# Patient Record
Sex: MALE | Race: AMERICAN INDIAN OR ALASKA NATIVE | NOT HISPANIC OR LATINO | Employment: FULL TIME | URBAN - METROPOLITAN AREA
[De-identification: names, ages, dates, MRNs, and addresses within clinical notes are randomized per-mention and may not be internally consistent; named-entity substitution may affect disease eponyms.]

---

## 2019-10-08 ENCOUNTER — OFFICE VISIT (OUTPATIENT)
Dept: URGENT CARE | Facility: URGENT CARE | Age: 39
End: 2019-10-08
Payer: COMMERCIAL

## 2019-10-08 VITALS
BODY MASS INDEX: 39.11 KG/M2 | DIASTOLIC BLOOD PRESSURE: 80 MMHG | HEIGHT: 70 IN | RESPIRATION RATE: 18 BRPM | HEART RATE: 78 BPM | OXYGEN SATURATION: 97 % | WEIGHT: 273.2 LBS | TEMPERATURE: 98 F | SYSTOLIC BLOOD PRESSURE: 146 MMHG

## 2019-10-08 DIAGNOSIS — M27.3 INFECTION OF TOOTH SOCKET: ICD-10-CM

## 2019-10-08 DIAGNOSIS — K08.9 DENTAL DISORDER: Primary | ICD-10-CM

## 2019-10-08 DIAGNOSIS — R03.0 ELEVATED BLOOD PRESSURE READING WITHOUT DIAGNOSIS OF HYPERTENSION: ICD-10-CM

## 2019-10-08 PROCEDURE — 99204 OFFICE O/P NEW MOD 45 MIN: CPT | Performed by: PHYSICIAN ASSISTANT

## 2019-10-08 RX ORDER — CLINDAMYCIN HCL 300 MG
300 CAPSULE ORAL 3 TIMES DAILY
Qty: 30 CAPSULE | Refills: 0 | Status: SHIPPED | OUTPATIENT
Start: 2019-10-08 | End: 2019-10-18

## 2019-10-08 ASSESSMENT — MIFFLIN-ST. JEOR: SCORE: 2170.48

## 2019-10-09 NOTE — PATIENT INSTRUCTIONS
Ole to follow up with Primary Care provider regarding elevated blood pressure.  To the emergency room if headache does not improve or worsens over the next 24 hours.  Obtain primary and follow-up within 1 week.

## 2019-10-09 NOTE — PROGRESS NOTES
"S: 38-year-old male presents for right upper molar tooth pain for 2 days.  He states he had a right upper tooth extraction done in July.  The dentist said he was unable to get the entire tooth.  After that he had some more tooth extractions on that size and they mentioned that there was a remaining portion of some tooth.  Last 2 days he has had pain in the area.  He has also had a headache.    Past medical history-years ago was diagnosed with prediabetes but lost weight  and blood sugar returned to normal  Family medical history-many with diabetes  Social-non-smoker    Allergies   Allergen Reactions     Penicillins Hives       No past medical history on file.    IBUPROFEN 200 MG OR TABS, 1 TABLET EVERY 4 TO 6 HOURS AS NEEDED    No current facility-administered medications on file prior to visit.       Social History     Tobacco Use     Smoking status: Current Every Day Smoker     Packs/day: 1.00     Smokeless tobacco: Never Used   Substance Use Topics     Alcohol use: No       ROS:  CONSTITUTIONAL: Negative for fatigue or fever.  EYES: Negative for eye problems.  ENT: As above.  RESP: Negative for cough.  CV: Negative for chest pains.  GI: Negative for vomiting.  MUSCULOSKELETAL:  Negative for significant muscle or joint pains.  NEUROLOGIC: Positive for headaches.  SKIN: Negative for rash.  PSYCH: Normal mentation for age.    OBJECTIVE:  BP (!) 168/103   Pulse 78   Temp 98  F (36.7  C) (Oral)   Resp 18   Ht 1.778 m (5' 10\")   Wt 123.9 kg (273 lb 3.2 oz)   SpO2 97%   BMI 39.20 kg/m    GENERAL APPEARANCE: Healthy, alert and no distress.  Repeat /80  EYES:Conjunctiva/sclera clear.  EARS: No cerumen.   Ear canals w/o erythema.  TM's intact w/o erythema.    NOSE/MOUTH: Right posterior upper mouth where her tooth was removed the gum is swollen and inflamed and tender to palpation.    SINUSES: No maxillary sinus tenderness.  THROAT: No erythema w/o tonsillar enlargement . No exudates.  NECK: Supple, nontender, " no lymphadenopathy.  RESP: Lungs clear to auscultation - no rales, rhonchi or wheezes  CV: Regular rate and rhythm, normal S1 S2, no murmur noted.  NEURO: Awake, alert    SKIN: No rashes  Neg pronator drift.  Smile symmetric.      ASSESSMENT:     ICD-10-CM    1. Dental disorder K08.9 clindamycin (CLEOCIN) 300 MG capsule   2. Infection of tooth socket M27.3    3. Elevated blood pressure reading without diagnosis of hypertension R03.0            PLAN:Ole to follow up with Primary Care provider regarding elevated blood pressure.  See dentist 3 to 5 days.  To ER if headache worsens or does not improve over the next 24 hours. HA can be a sign of stroke although I feel the HA is likely from the tooth problem. Obtain primary and follow-up in 1 week.  I have discussed clinical findings with patient.  Side effects of medications discussed.  Symptomatic care is discussed.  I have discussed the possibility of  worsening symptoms and to RTC or ER if they occur.  All questions are answered and patient is in agreement with plan.   Patient care instructions are given to at the end of visit.   Lots of rest and fluids.    Eli Rhoades PA-C

## 2021-12-31 ENCOUNTER — TRANSFERRED RECORDS (OUTPATIENT)
Dept: OPTOMETRY | Facility: CLINIC | Age: 41
End: 2021-12-31

## 2021-12-31 LAB — RETINOPATHY: NORMAL

## 2023-04-19 ENCOUNTER — OFFICE VISIT (OUTPATIENT)
Dept: FAMILY MEDICINE | Facility: CLINIC | Age: 43
End: 2023-04-19
Payer: COMMERCIAL

## 2023-04-19 ENCOUNTER — TELEPHONE (OUTPATIENT)
Dept: FAMILY MEDICINE | Facility: CLINIC | Age: 43
End: 2023-04-19

## 2023-04-19 VITALS
SYSTOLIC BLOOD PRESSURE: 136 MMHG | TEMPERATURE: 98.1 F | OXYGEN SATURATION: 98 % | HEIGHT: 70 IN | RESPIRATION RATE: 16 BRPM | BODY MASS INDEX: 35.99 KG/M2 | WEIGHT: 251.4 LBS | HEART RATE: 83 BPM | DIASTOLIC BLOOD PRESSURE: 84 MMHG

## 2023-04-19 DIAGNOSIS — Z13.220 SCREENING FOR HYPERLIPIDEMIA: ICD-10-CM

## 2023-04-19 DIAGNOSIS — E11.9 TYPE 2 DIABETES MELLITUS WITHOUT COMPLICATION, UNSPECIFIED WHETHER LONG TERM INSULIN USE (H): ICD-10-CM

## 2023-04-19 DIAGNOSIS — Z13.1 SCREENING FOR DIABETES MELLITUS: ICD-10-CM

## 2023-04-19 DIAGNOSIS — Z11.4 SCREENING FOR HIV (HUMAN IMMUNODEFICIENCY VIRUS): ICD-10-CM

## 2023-04-19 DIAGNOSIS — Z11.59 NEED FOR HEPATITIS C SCREENING TEST: ICD-10-CM

## 2023-04-19 DIAGNOSIS — Z00.00 ROUTINE GENERAL MEDICAL EXAMINATION AT A HEALTH CARE FACILITY: Primary | ICD-10-CM

## 2023-04-19 DIAGNOSIS — F32.A DEPRESSION, UNSPECIFIED DEPRESSION TYPE: ICD-10-CM

## 2023-04-19 DIAGNOSIS — E78.1 HYPERTRIGLYCERIDEMIA: ICD-10-CM

## 2023-04-19 DIAGNOSIS — E55.9 VITAMIN D DEFICIENCY: ICD-10-CM

## 2023-04-19 DIAGNOSIS — R35.0 FREQUENT URINATION: ICD-10-CM

## 2023-04-19 LAB
ALBUMIN UR-MCNC: 30 MG/DL
APPEARANCE UR: CLEAR
BASOPHILS # BLD AUTO: 0.1 10E3/UL (ref 0–0.2)
BASOPHILS NFR BLD AUTO: 1 %
BILIRUB UR QL STRIP: NEGATIVE
COLOR UR AUTO: YELLOW
EOSINOPHIL # BLD AUTO: 0.2 10E3/UL (ref 0–0.7)
EOSINOPHIL NFR BLD AUTO: 3 %
ERYTHROCYTE [DISTWIDTH] IN BLOOD BY AUTOMATED COUNT: 12.5 % (ref 10–15)
GLUCOSE UR STRIP-MCNC: >=1000 MG/DL
HBA1C MFR BLD: 14.4 % (ref 0–5.6)
HCT VFR BLD AUTO: 45.8 % (ref 40–53)
HGB BLD-MCNC: 16.8 G/DL (ref 13.3–17.7)
HGB UR QL STRIP: NEGATIVE
KETONES UR STRIP-MCNC: >=80 MG/DL
LEUKOCYTE ESTERASE UR QL STRIP: NEGATIVE
LYMPHOCYTES # BLD AUTO: 2.7 10E3/UL (ref 0.8–5.3)
LYMPHOCYTES NFR BLD AUTO: 36 %
MCH RBC QN AUTO: 31.3 PG (ref 26.5–33)
MCHC RBC AUTO-ENTMCNC: 36.7 G/DL (ref 31.5–36.5)
MCV RBC AUTO: 85 FL (ref 78–100)
MONOCYTES # BLD AUTO: 0.4 10E3/UL (ref 0–1.3)
MONOCYTES NFR BLD AUTO: 6 %
NEUTROPHILS # BLD AUTO: 4.2 10E3/UL (ref 1.6–8.3)
NEUTROPHILS NFR BLD AUTO: 55 %
NITRATE UR QL: NEGATIVE
PH UR STRIP: 5.5 [PH] (ref 5–7)
PLATELET # BLD AUTO: 254 10E3/UL (ref 150–450)
RBC # BLD AUTO: 5.37 10E6/UL (ref 4.4–5.9)
RBC #/AREA URNS AUTO: NORMAL /HPF
SP GR UR STRIP: 1.01 (ref 1–1.03)
UROBILINOGEN UR STRIP-ACNC: 0.2 E.U./DL
WBC # BLD AUTO: 7.6 10E3/UL (ref 4–11)
WBC #/AREA URNS AUTO: NORMAL /HPF

## 2023-04-19 PROCEDURE — 84075 ASSAY ALKALINE PHOSPHATASE: CPT | Performed by: PHYSICIAN ASSISTANT

## 2023-04-19 PROCEDURE — 87389 HIV-1 AG W/HIV-1&-2 AB AG IA: CPT | Performed by: PHYSICIAN ASSISTANT

## 2023-04-19 PROCEDURE — 84443 ASSAY THYROID STIM HORMONE: CPT | Performed by: PHYSICIAN ASSISTANT

## 2023-04-19 PROCEDURE — 99386 PREV VISIT NEW AGE 40-64: CPT | Performed by: PHYSICIAN ASSISTANT

## 2023-04-19 PROCEDURE — 80061 LIPID PANEL: CPT | Performed by: PHYSICIAN ASSISTANT

## 2023-04-19 PROCEDURE — 82306 VITAMIN D 25 HYDROXY: CPT | Performed by: PHYSICIAN ASSISTANT

## 2023-04-19 PROCEDURE — 81001 URINALYSIS AUTO W/SCOPE: CPT | Performed by: PHYSICIAN ASSISTANT

## 2023-04-19 PROCEDURE — 85025 COMPLETE CBC W/AUTO DIFF WBC: CPT | Performed by: PHYSICIAN ASSISTANT

## 2023-04-19 PROCEDURE — 83721 ASSAY OF BLOOD LIPOPROTEIN: CPT | Mod: 59 | Performed by: PHYSICIAN ASSISTANT

## 2023-04-19 PROCEDURE — 82247 BILIRUBIN TOTAL: CPT | Performed by: PHYSICIAN ASSISTANT

## 2023-04-19 PROCEDURE — 80048 BASIC METABOLIC PNL TOTAL CA: CPT | Performed by: PHYSICIAN ASSISTANT

## 2023-04-19 PROCEDURE — 86803 HEPATITIS C AB TEST: CPT | Performed by: PHYSICIAN ASSISTANT

## 2023-04-19 PROCEDURE — 36415 COLL VENOUS BLD VENIPUNCTURE: CPT | Performed by: PHYSICIAN ASSISTANT

## 2023-04-19 PROCEDURE — 83036 HEMOGLOBIN GLYCOSYLATED A1C: CPT | Performed by: PHYSICIAN ASSISTANT

## 2023-04-19 PROCEDURE — 84155 ASSAY OF PROTEIN SERUM: CPT | Performed by: PHYSICIAN ASSISTANT

## 2023-04-19 PROCEDURE — 82040 ASSAY OF SERUM ALBUMIN: CPT | Performed by: PHYSICIAN ASSISTANT

## 2023-04-19 RX ORDER — LANCETS
EACH MISCELLANEOUS
Qty: 60 EACH | Refills: 6 | Status: SHIPPED | OUTPATIENT
Start: 2023-04-19

## 2023-04-19 RX ORDER — METFORMIN HCL 500 MG
TABLET, EXTENDED RELEASE 24 HR ORAL
Qty: 80 TABLET | Refills: 0 | Status: SHIPPED | OUTPATIENT
Start: 2023-04-19 | End: 2023-05-19

## 2023-04-19 RX ORDER — INSULIN GLARGINE 300 U/ML
22 INJECTION, SOLUTION SUBCUTANEOUS AT BEDTIME
Qty: 2.2 ML | Refills: 0 | Status: SHIPPED | OUTPATIENT
Start: 2023-04-19 | End: 2023-05-19

## 2023-04-19 RX ORDER — GLUCOSAMINE HCL/CHONDROITIN SU 500-400 MG
CAPSULE ORAL
Qty: 100 EACH | Refills: 3 | Status: SHIPPED | OUTPATIENT
Start: 2023-04-19

## 2023-04-19 ASSESSMENT — ENCOUNTER SYMPTOMS
NAUSEA: 0
MYALGIAS: 1
HEMATURIA: 0
DIARRHEA: 0
FREQUENCY: 1
CHILLS: 1
NERVOUS/ANXIOUS: 1
ARTHRALGIAS: 1
DYSURIA: 0
ABDOMINAL PAIN: 0
PARESTHESIAS: 1
JOINT SWELLING: 1
DIZZINESS: 1
FEVER: 0
COUGH: 1
CONSTIPATION: 0
SORE THROAT: 0
WEAKNESS: 1
HEADACHES: 0
EYE PAIN: 0
HEARTBURN: 0
SHORTNESS OF BREATH: 0
PALPITATIONS: 1
HEMATOCHEZIA: 0

## 2023-04-19 ASSESSMENT — PAIN SCALES - GENERAL: PAINLEVEL: MODERATE PAIN (5)

## 2023-04-19 ASSESSMENT — PATIENT HEALTH QUESTIONNAIRE - PHQ9
SUM OF ALL RESPONSES TO PHQ QUESTIONS 1-9: 20
10. IF YOU CHECKED OFF ANY PROBLEMS, HOW DIFFICULT HAVE THESE PROBLEMS MADE IT FOR YOU TO DO YOUR WORK, TAKE CARE OF THINGS AT HOME, OR GET ALONG WITH OTHER PEOPLE: SOMEWHAT DIFFICULT
SUM OF ALL RESPONSES TO PHQ QUESTIONS 1-9: 20

## 2023-04-19 NOTE — TELEPHONE ENCOUNTER
Pt notified of provider message as written.  Pt verbalized good understanding.  Sara Vu BSN, RN

## 2023-04-19 NOTE — TELEPHONE ENCOUNTER
Please call patient.     Medications are sent to the [facility]. There is a voucher for one month of insulin waiting for him.     Thierno Farmer PA-C

## 2023-04-19 NOTE — TELEPHONE ENCOUNTER
Writer called and spoke with patient regarding lab results notice from today's visit and plan for diabetes treatment.    Patient stated that during conversation with provider at time of visit, possibility of starting on medication management was discussed, patient thought provider mentioned something about a voucher for one of his prescriptions? Possibly for the insulin?     Patient prescriptions were sent to [facility] pharmacy.     And patient stated he was told to return back to the clinic this evening and provider would show or have someone show him about using insulin pen? Patient asking if he is still supposed to return to the clinic tonight?    Writer unable to find further documentation of these items, unclear of recommendations for voucher or need for insulin/pen teaching.      Routing to provider to review and advise or update team on plan for patient regarding above. Thank you!        Chanell Castillo RN  Clinical Triage/Primary Care  [facility]

## 2023-04-19 NOTE — PROGRESS NOTES
SUBJECTIVE:   CC: Ole is an 42 year old who presents for preventative health visit.        View : No data to display.              Patient has been advised of split billing requirements and indicates understanding: Yes  Healthy Habits:     Getting at least 3 servings of Calcium per day:  NO    Bi-annual eye exam:  Yes    Dental care twice a year:  NO    Sleep apnea or symptoms of sleep apnea:  Daytime drowsiness    Diet:  Regular (no restrictions)    Frequency of exercise:  1 day/week    Duration of exercise:  Less than 15 minutes    Taking medications regularly:  Yes    Medication side effects:  None    PHQ-2 Total Score: 6    Additional concerns today:  Yes    Patient has been donating blood through [facility].  He had to stop donating due to elevated lipids.  He has been trying lifestyle modifications including increased omega-3's.    Patient has history of elevated liver enzymes in the past as he does endorse a history of alcohol abuse.  No longer consuming alcohol.    No family history of colon cancer or prostate cancer.    Patient is currently not a smoker.    Does have issues with daytime drowsiness.  He has poor sleep overnight.  Patient also reports increasing depression over the winter months.  This does tend to exacerbate in the wintertime as he works as a seasonal job outdoors.  During the summertime he has no significant depressive episodes, however, in the winter months these become more apparent.  Further, does feel more stress related to caring for his 6 children.  Patient denies any thoughts of self-harm.  He has seen a therapist in the past for his depression.  He prefers not to be on any medications for his depression.    Today's PHQ-2 Score:       4/19/2023    11:18 AM   PHQ-2 ( 1999 [employer])   Q1: Little interest or pleasure in doing things 3   Q2: Feeling down, depressed or hopeless 3   PHQ-2 Score 6   Q1: Little interest or pleasure in doing things Nearly every day   Q2: Feeling down,  depressed or hopeless Nearly every day   PHQ-2 Score 6       Have you ever done Advance Care Planning? (For example, a Health Directive, POLST, or a discussion with a medical provider or your loved ones about your wishes): No, advance care planning information given to patient to review.  Advanced care planning was discussed at today's visit.    Social History     Tobacco Use     Smoking status: Former     Packs/day: 1.00     Types: Cigarettes     Quit date: 2022     Years since quittin.7     Smokeless tobacco: Never   Vaping Use     Vaping status: Some Days   Substance Use Topics     Alcohol use: No           2023    11:17 AM   Alcohol Use   Prescreen: >3 drinks/day or >7 drinks/week? Not Applicable     Last PSA: No results found for: PSA    Reviewed orders with patient. Reviewed health maintenance and updated orders accordingly - Yes    Reviewed and updated as needed this visit by clinical staff   Tobacco  Allergies  Meds              Reviewed and updated as needed this visit by Provider                 No past medical history on file.   No past surgical history on file.    Review of Systems   Constitutional: Positive for chills. Negative for fever.   HENT: Negative for congestion, ear pain, hearing loss and sore throat.    Eyes: Positive for visual disturbance. Negative for pain.   Respiratory: Positive for cough. Negative for shortness of breath.    Cardiovascular: Positive for chest pain, palpitations and peripheral edema.   Gastrointestinal: Negative for abdominal pain, constipation, diarrhea, heartburn, hematochezia and nausea.   Genitourinary: Positive for frequency and urgency. Negative for dysuria, genital sores, hematuria, impotence and penile discharge.   Musculoskeletal: Positive for arthralgias, joint swelling and myalgias.   Skin: Negative for rash.   Neurological: Positive for dizziness, weakness and paresthesias. Negative for headaches.   Psychiatric/Behavioral: Positive for mood  "changes. The patient is nervous/anxious.      OBJECTIVE:   /84   Pulse 83   Temp 98.1  F (36.7  C) (Tympanic)   Resp 16   Ht 1.778 m (5' 10\")   Wt 114 kg (251 lb 6.4 oz)   SpO2 98%   BMI 36.07 kg/m      Physical Exam   GENERAL: alert, no distress and over weight  EYES: Eyes grossly normal to inspection, PERRL and conjunctivae and sclerae normal  HENT: ear canals and TM's normal, nose and mouth without ulcers or lesions  NECK: no adenopathy, no asymmetry, masses, or scars and thyroid normal to palpation  RESP: lungs clear to auscultation - no rales, rhonchi or wheezes  CV: regular rate and rhythm, normal S1 S2, no S3 or S4, no murmur, click or rub, no peripheral edema and peripheral pulses strong  ABDOMEN: soft, nontender, no hepatosplenomegaly, no masses and bowel sounds normal  MS: no gross musculoskeletal defects noted, no edema  SKIN: no suspicious lesions or rashes  NEURO: Normal strength and tone, mentation intact and speech normal  PSYCH: mentation appears normal, affect normal/bright      ASSESSMENT/PLAN:   (Z00.00) Routine general medical examination at a health care facility  (primary encounter diagnosis)  Comment: Here for routine screening examination.  Plan: Comprehensive metabolic panel (BMP + Alb, Alk         Phos, ALT, AST, Total. Bili, TP), CBC with         platelets and differential          (Z11.4) Screening for HIV (human immunodeficiency virus)  Comment: Discussed screening labs  Plan: HIV Antigen Antibody Combo          (Z11.59) Need for hepatitis C screening test  Comment: Discussed routine labs  Plan: Hepatitis C Screen Reflex to HCV RNA Quant and         Genotype          (Z13.220) Screening for hyperlipidemia  Comment: Discussed screening hyperlipidemia.  Plan: Lipid panel reflex to direct LDL Non-fasting          (Z13.1) Screening for diabetes mellitus  Comment: Discussed screening hemoglobin A1c.  Patient is at frequent urination.  Despite frequently overnight for urination.  " "Family history of diabetes.  Did discuss potential metformin pending these results.  Plan: Hemoglobin A1c          (R35.0) Frequent urination  Comment: Frequent urination.  We will discuss urinalysis may be related to possible diabetes.  Plan: UA Macro with Reflex to Micro and Culture - lab        collect, UA Microscopic with Reflex to Culture          (F32.A) Depression, unspecified depression type  Comment: History of depression.  No current thoughts of self-harm.  Has not been on medications for this previously has not been evaluated therapist.  Has not seen a therapist for multiple years.  Patient was offered potential for medications.  He declined at this time.  Patient is interested in following up with therapist  Plan: TSH with free T4 reflex, Vitamin D Deficiency             COUNSELING:   Reviewed preventive health counseling, as reflected in patient instructions       Regular exercise       Healthy diet/nutrition       Vision screening       Consider Hep C screening for all patients one time for ages 18-79 years       HIV screeninx in teen years, 1x in adult years, and at intervals if high risk      BMI:   Estimated body mass index is 36.07 kg/m  as calculated from the following:    Height as of this encounter: 1.778 m (5' 10\").    Weight as of this encounter: 114 kg (251 lb 6.4 oz).   Weight management plan: Discussed healthy diet and exercise guidelines      He reports that he quit smoking about 8 months ago. His smoking use included cigarettes. He smoked an average of 1 pack per day. He has never used smokeless tobacco.            Thierno Farmer PA-C  [facility]  Answers for HPI/ROS submitted by the patient on 2023  If you checked off any problems, how difficult have these problems made it for you to do your work, take care of things at home, or get along with other people?: Somewhat difficult  PHQ9 TOTAL SCORE: 20      "

## 2023-04-20 ENCOUNTER — NURSE TRIAGE (OUTPATIENT)
Dept: NURSING | Facility: CLINIC | Age: 43
End: 2023-04-20
Payer: COMMERCIAL

## 2023-04-20 ENCOUNTER — TELEPHONE (OUTPATIENT)
Dept: FAMILY MEDICINE | Facility: CLINIC | Age: 43
End: 2023-04-20
Payer: COMMERCIAL

## 2023-04-20 LAB
ALBUMIN SERPL BCG-MCNC: 4.2 G/DL (ref 3.5–5.2)
ALP SERPL-CCNC: 131 U/L (ref 40–129)
ALT SERPL W P-5'-P-CCNC: ABNORMAL U/L
ANION GAP SERPL CALCULATED.3IONS-SCNC: 13 MMOL/L (ref 7–15)
AST SERPL W P-5'-P-CCNC: ABNORMAL U/L
BILIRUB SERPL-MCNC: 0.3 MG/DL
BUN SERPL-MCNC: 12.4 MG/DL (ref 6–20)
CALCIUM SERPL-MCNC: 9.5 MG/DL (ref 8.6–10)
CHLORIDE SERPL-SCNC: 93 MMOL/L (ref 98–107)
CHOLEST SERPL-MCNC: 372 MG/DL
CREAT SERPL-MCNC: 0.62 MG/DL (ref 0.67–1.17)
DEPRECATED CALCIDIOL+CALCIFEROL SERPL-MC: 11 UG/L (ref 20–75)
DEPRECATED HCO3 PLAS-SCNC: 21 MMOL/L (ref 22–29)
GFR SERPL CREATININE-BSD FRML MDRD: >90 ML/MIN/1.73M2
GLUCOSE SERPL-MCNC: 335 MG/DL (ref 70–99)
HCV AB SERPL QL IA: NONREACTIVE
HDLC SERPL-MCNC: 24 MG/DL
HIV 1+2 AB+HIV1 P24 AG SERPL QL IA: NONREACTIVE
LDLC SERPL CALC-MCNC: ABNORMAL MG/DL
LDLC SERPL DIRECT ASSAY-MCNC: 95 MG/DL
NONHDLC SERPL-MCNC: 348 MG/DL
POTASSIUM SERPL-SCNC: 4.3 MMOL/L (ref 3.4–5.3)
PROT SERPL-MCNC: 7.5 G/DL (ref 6.4–8.3)
SODIUM SERPL-SCNC: 127 MMOL/L (ref 136–145)
TRIGL SERPL-MCNC: 2386 MG/DL
TSH SERPL DL<=0.005 MIU/L-ACNC: 1.96 UIU/ML (ref 0.3–4.2)

## 2023-04-20 RX ORDER — VITAMIN B COMPLEX
25 TABLET ORAL DAILY
Qty: 90 TABLET | Refills: 1 | Status: SHIPPED | OUTPATIENT
Start: 2023-04-20 | End: 2023-10-10

## 2023-04-20 NOTE — TELEPHONE ENCOUNTER
Prior Authorization Retail Medication Request    Medication/Dose: Toujeo  ICD code (if different than what is on RX):    Previously Tried and Failed:    Rationale:      Insurance Name:  [facility]  Insurance ID:  [id number]      Pharmacy Information (if different than what is on RX)  Name:  [facility]  Phone:  [phone]      Mary Ann Tejada    Pharmacy Technician  [facility]

## 2023-04-21 ENCOUNTER — TELEPHONE (OUTPATIENT)
Dept: FAMILY MEDICINE | Facility: CLINIC | Age: 43
End: 2023-04-21
Payer: COMMERCIAL

## 2023-04-21 RX ORDER — ATORVASTATIN CALCIUM 40 MG/1
40 TABLET, FILM COATED ORAL DAILY
Qty: 30 TABLET | Refills: 0 | Status: SHIPPED | OUTPATIENT
Start: 2023-04-21 | End: 2023-05-17

## 2023-04-21 RX ORDER — FENOFIBRATE 145 MG/1
145 TABLET, COATED ORAL DAILY
Qty: 30 TABLET | Refills: 0 | Status: SHIPPED | OUTPATIENT
Start: 2023-04-21 | End: 2023-05-17

## 2023-04-21 NOTE — TELEPHONE ENCOUNTER
See also telephone encounter regarding lab results to relay to patient      Guerline PEREZ, RN

## 2023-04-21 NOTE — TELEPHONE ENCOUNTER
Patient notified of provider's message as written below. Patient verbalized good understanding, had no further questions and needed no further support.Sindy Ann R.N.

## 2023-04-21 NOTE — TELEPHONE ENCOUNTER
Pt's spouse, received verbal consent to communicate, pt was prescribed insulin Toujeo yesterday, wanting to confirm dosage of 22 units every night at bedtime. Per chart review, confirmed Rx- 22 units at bedtime for 30 days.    Current BG is 323.     Pt and spouse would like more information about how long acting insulin medication works, thought that 22 units was a bit much. Will route to care team.   Denies symptoms. Advised call back if have other questions/concerns.    Jeannette Reid RN, BSN  4/20/2023 at 9:55 PM  [facility]        Reason for Disposition    Caller has medicine question only, adult not sick, AND triager answers question    Protocols used: MEDICATION QUESTION CALL-A-AH

## 2023-04-21 NOTE — TELEPHONE ENCOUNTER
Patient notified of provider's message as written below. The patient was warm transferred to central scheduling and they were asked to assist patient in making an appointment in 1 month at the patient's preferred location. They verified they understood. Patient verbalized good understanding, had no further questions and needed no further support.Sindy Ann R.N.

## 2023-04-21 NOTE — TELEPHONE ENCOUNTER
Central Prior Authorization Team   Phone: [phone]      DUPLICATE ENCOUNTER - PLEASE SEE ENCOUNTER DATED 04/20/23. MEDICATION WAS SUBMITTED AND DENIED THROUGH [employer].  PLEASE SEE ENCOUNTER FOR APPEAL INFORMATION. I WILL CLOSE THIS ENCOUNTER.

## 2023-04-21 NOTE — TELEPHONE ENCOUNTER
----- Message from Thierno Farmer PA-C sent at 4/21/2023 12:30 PM CDT -----  Please call patient.     Mr. Joseph,     Your cholesterol panel returned with very high triglycerides.  Need to start you on a fibrate medication to help lower this.  Also need to start a statin medication to help improve your overall cholesterol and decrease your cardiovascular risk factors.  Can have muscle aches with statin medication.  If this develops please stop taking this medication.    I have sent these medications to [facility].     Follow-up within 1 month to see how you are tolerating these medications and discuss further medication regimens.    Again if there is any new severe abdominal pain, vomiting or new concerns would need to be seen in the emergency department.    Thierno Farmer PA-C

## 2023-04-21 NOTE — TELEPHONE ENCOUNTER
Call to patient - Left message on answering machine for patient to call back.  Guerline ALVARENGAN, RN

## 2023-04-21 NOTE — TELEPHONE ENCOUNTER
Central Prior Authorization Team   Phone: [phone]      PRIOR AUTHORIZATION DENIED    Medication: insulin glargine U-300 (TOUJEO SOLOSTAR) 300 UNIT/ML (1 units dial) pen - [employer] DENIAL    Denial Date: 4/20/2023    Denial Rational: Coverage of the requested drug is provided when patients have tried two or more preferred chemically unique drugs within the same drug class on the Preferred Drug List: Examples of formulary alternatives include: LANTUS, LEVEMIR. Coverage cannot be authorized at this time.          Appeal Information:

## 2023-04-21 NOTE — TELEPHONE ENCOUNTER
Please call patient.     This dose is appropriate for the patient's weight. This form of insulin is slower onset with no pronounced peak and overall longer duration of activity. This will help get glucose under better control over the next month. Please follow up with diabetic educator.       Thank you,   Thierno Farmer PA-C

## 2023-04-21 NOTE — TELEPHONE ENCOUNTER
Left message on answering machine for patient to call back.    Guerline PEREZ, RN        See also nurse triage message that needs to be relayed to patient     Guerline PEREZ, RN

## 2023-04-27 NOTE — TELEPHONE ENCOUNTER
Insurance will cover Lantus or Levemir. Do you want to change med or appeal??    Thank You  Kelly Mensah, [facility]  [phone]

## 2023-04-28 NOTE — TELEPHONE ENCOUNTER
Patient was provided a 30-day supply of Toujeo via voucher.    Will discuss ongoing medications at next visit as patient has currently scheduled.    Thierno Farmer PA-C

## 2023-05-03 ENCOUNTER — TELEPHONE (OUTPATIENT)
Dept: BEHAVIORAL HEALTH | Facility: CLINIC | Age: 43
End: 2023-05-03
Payer: COMMERCIAL

## 2023-05-03 NOTE — TELEPHONE ENCOUNTER
Writer left a message with the patient, following up from recent appointment with patient's PCP.  Explained writer's role and noted that writer had reviewed their chart and felt she could offer support. Writer encouraged the patient to call A and schedule with the [facility] if they are interested.     CORRIE Nascimento, [facility]  Behavioral Health Clinician  [facility]  [id number] , [place] [address]  [phone]

## 2023-05-08 PROBLEM — E11.9 TYPE 2 DIABETES MELLITUS WITHOUT COMPLICATION (H): Status: ACTIVE | Noted: 2023-05-08

## 2023-05-17 DIAGNOSIS — E78.1 HYPERTRIGLYCERIDEMIA: ICD-10-CM

## 2023-05-17 RX ORDER — FENOFIBRATE 145 MG/1
145 TABLET, COATED ORAL DAILY
Qty: 30 TABLET | Refills: 0 | Status: SHIPPED | OUTPATIENT
Start: 2023-05-17 | End: 2023-05-24

## 2023-05-17 RX ORDER — ATORVASTATIN CALCIUM 40 MG/1
40 TABLET, FILM COATED ORAL DAILY
Qty: 30 TABLET | Refills: 0 | Status: SHIPPED | OUTPATIENT
Start: 2023-05-17 | End: 2023-05-24

## 2023-05-19 ENCOUNTER — ALLIED HEALTH/NURSE VISIT (OUTPATIENT)
Dept: EDUCATION SERVICES | Facility: CLINIC | Age: 43
End: 2023-05-19
Payer: COMMERCIAL

## 2023-05-19 DIAGNOSIS — E11.9 TYPE 2 DIABETES MELLITUS WITHOUT COMPLICATION, UNSPECIFIED WHETHER LONG TERM INSULIN USE (H): ICD-10-CM

## 2023-05-19 PROCEDURE — 99207 PR NO CHARGE NURSE ONLY: CPT

## 2023-05-19 PROCEDURE — G0108 DIAB MANAGE TRN  PER INDIV: HCPCS

## 2023-05-19 RX ORDER — INSULIN GLARGINE 300 U/ML
22 INJECTION, SOLUTION SUBCUTANEOUS AT BEDTIME
Qty: 2.2 ML | Refills: 0 | Status: SHIPPED | OUTPATIENT
Start: 2023-05-19 | End: 2023-12-04

## 2023-05-19 RX ORDER — METFORMIN HCL 500 MG
1000 TABLET, EXTENDED RELEASE 24 HR ORAL 2 TIMES DAILY WITH MEALS
Qty: 360 TABLET | Refills: 1 | Status: SHIPPED | OUTPATIENT
Start: 2023-05-19 | End: 2023-11-09

## 2023-05-19 RX ORDER — BLOOD-GLUCOSE SENSOR
1 EACH MISCELLANEOUS CONTINUOUS
Qty: 6 EACH | Refills: 11 | Status: SHIPPED | OUTPATIENT
Start: 2023-05-19 | End: 2023-06-02

## 2023-05-19 NOTE — LETTER
5/19/2023         RE: Ole Joseph  [address] [place] [address]        Dear Colleague,    Thank you for referring your patient, Ole Joseph, to the [facility]. Please see a copy of my visit note below.    Diabetes Self-Management Education & Support    Presents for: Initial Assessment for new diagnosis    Type of Service: In Person Visit    Assessment Type:   ASSESSMENT:  Ole is here newly diagnosed with diabetes he said he was told years ago that he was prediabetes follow-up and they said it was gone he has children that he is active with past history of alcoholism and he has not had alcohol in years many family members with diabetes and he is so glad he came in to see a doctor without the symptoms he had for diabetes he is well into learning as much as he can about diabetes and management and meal planning he has been cutting back on carbs avoiding them so I went over the plate planning method with him he is actively exercising and the  at his club also told him he is not eating enough carbohydrates for with the exercise and management for his diabetes, I did place a sensor on him today we will look and see how his blood sugars are over the weekend and possibly take him off of insulin and starting a GLP-1 for him when I follow-up with him he is a great candidate for this and I believe he will do very very well he does have children that are heading down the same road and he is hoping to give them a good example of how to treat his diabetes and to live a healthy life    Patient's most recent   Lab Results   Component Value Date    A1C 14.4 04/19/2023     is not meeting goal of <7.0    Diabetes knowledge and skills assessment:   Patient is knowledgeable in diabetes management concepts related to: Being Active, Monitoring, Taking Medication and Healthy Coping    Continue education with the following diabetes management concepts: Healthy Eating, Taking Medication,  Problem Solving and Reducing Risks    Based on learning assessment above, most appropriate setting for further diabetes education would be: Group class setting.      PLAN    1. Metformin  mg take 4 pills daily  2. Toujeo 22 units daily  3. Consider putting you on either Ozempic, Trulicity or Mounjaro.    4. 3 meals per day with 3-5 carb choices, with proteins , and 2-3 snacks each day with 1-2 carb choices   5. Keep up exercises and stay hydrated.    Topics to cover at upcoming visits: Healthy Eating, Monitoring, Taking Medication and Problem Solving    Follow-up:     See Care Plan for co-developed, patient-state behavior change goals.  AVS provided for patient today.    Education Materials Provided:   [employer] Understanding Diabetes Booklet, Carbohydrate Counting and My Plate Planner      SUBJECTIVE/OBJECTIVE:  Presents for: Initial Assessment for new diagnosis  Accompanied by: Self  Diabetes education in the past 24mo: No  Focus of Visit: Diabetes Pathophysiology, Assistance w/ making life changes, Taking Medication, Healthy Eating  Diabetes type: Type 2  Disease course: Improving  How confident are you filling out medical forms by yourself:: Extremely  Transportation concerns: No  Difficulty affording diabetes medication?: Sometimes  Difficulty affording diabetes testing supplies?: No  Other concerns:: None  Cultural Influences/Ethnic Background:  Not  or       Diabetes Symptoms & Complications:  Fatigue: Sometimes  Neuropathy: Sometimes  Polydipsia: No  Polyphagia: Yes  Polyuria: No  Visual change: Yes  Slow healing wounds: Sometimes  Symptom course: Improving  Weight trend: Stable  Autonomic neuropathy: Other  CVA: No  Heart disease: No  Nephropathy: Other  Peripheral neuropathy: Other  Peripheral Vascular Disease: Other  Retinopathy: No  Sexual dysfunction: No    Patient Problem List and Family Medical History reviewed for relevant medical history, current medical status, and  "diabetes risk factors.    Vitals:  There were no vitals taken for this visit.  Estimated body mass index is 36.07 kg/m  as calculated from the following:    Height as of 4/19/23: 1.778 m (5' 10\").    Weight as of 4/19/23: 114 kg (251 lb 6.4 oz).   Last 3 BP:   BP Readings from Last 3 Encounters:   04/19/23 136/84   10/08/19 (!) 146/80   08/19/14 149/49       History   Smoking Status     Former     Packs/day: 1.00     Types: Cigarettes     Quit date: 8/1/2022   Smokeless Tobacco     Never       Labs:  Lab Results   Component Value Date    A1C 14.4 04/19/2023     Lab Results   Component Value Date     04/19/2023     Lab Results   Component Value Date    LDL  04/19/2023      Comment:      Cannot estimate LDL when triglyceride exceeds 400 mg/dL    LDL 95 04/19/2023     Direct Measure HDL   Date Value Ref Range Status   04/19/2023 24 (L) >=40 mg/dL Final   ]  GFR Estimate   Date Value Ref Range Status   04/19/2023 >90 >60 mL/min/1.73m2 Final     Comment:     eGFR calculated using 2021 CKD-EPI equation.     No results found for: GFRESTBLACK  Lab Results   Component Value Date    CR 0.62 04/19/2023     No results found for: MICROALBUMIN    Healthy Eating:  Cultural/Orthodox diet restrictions?: No  Meal planning/habits: Avoiding sweets, Carb counting, Heart healthy, Low salt, Smaller portions, Plate planning method  How many times a week on average do you eat food made away from home (restaurant/take-out)?: 1  Breakfast: skips, Glycerna,  Lunch: skips  Dinner: chx, breast drummie and wing, and salad  Beverages: Water, Milk  Has patient met with a dietitian in the past?: No    Being Active:  Being Active Assessed Today: Yes  Exercise:: Yes  Days per week of moderate to strenuous exercise (like a brisk walk): 6  On average, minutes per day of exercise at this level: 150 or greater  How intense was your typical exercise? : Heavy (like jogging or swimming  Exercise Minutes per Week: 900  Barrier to exercise: Time, " Access    Monitoring:  Monitoring Assessed Today: Yes  Did patient bring glucose meter to appointment? : Yes  Blood Glucose Meter: ContourNext  Times checking blood sugar at home (number): 2  Times checking blood sugar at home (per): Day  Blood glucose trend: Decreasing        Taking Medications:  Diabetes Medication(s)     Biguanides       metFORMIN (GLUCOPHAGE XR) 500 MG 24 hr tablet    Take 2 tablets (1,000 mg) by mouth 2 times daily (with meals) Take 2 pills with breakfast and 2 pills with dinner    Insulin       insulin degludec (TRESIBA) 100 UNIT/ML pen    Take 22 units daily + use 2 unit prime with each dose for a total of 30 units/day     insulin glargine U-300 (TOUJEO SOLOSTAR) 300 UNIT/ML (1 units dial) pen    Inject 22 Units Subcutaneous At Bedtime          Current Treatments: Diet, Insulin Injections, Oral Medication (taken by mouth)  Dose schedule: At bedtime  Given by: Patient  Injection/Infusion sites: Abdomen  Problems taking diabetes medications regularly?: No  Diabetes medication side effects?: No    Problem Solving:  Problem Solving Assessed Today: No              Reducing Risks:  Diabetes Risks: Hypertriglyceridemia, Family History  CAD Risks: Diabetes Mellitus, Dyslipidemia, Family history, Stress, Male sex  Has dilated eye exam at least once a year?: No  Sees dentist every 6 months?: No  Feet checked by healthcare provider in the last year?: No    Healthy Coping:  Healthy Coping Assessed Today: Yes  Emotional response to diabetes: Ready to learn  Informal Support system:: Children, Family, Friends, Parent, Partner  Stage of change: PREPARATION (Decided to change - considering how)  Patient Activation Measure Survey Score:       View : No data to display.                  Care Plan and Education Provided:  Care Plan: Diabetes   Updates made by Aimee Nickerson RN since 5/19/2023 12:00 AM      Problem: HbA1C Not In Goal       Goal: Establish Regular Follow-Ups with PCP    Start Date: 5/19/2023    This Visit's Progress: 50%      Task: Discuss with PCP the recommended timing for patient's next follow up visit(s) Completed 5/19/2023   Responsible User: Aimee Nickerson RN      Task: Discuss schedule for PCP visits with patient Completed 5/19/2023   Responsible User: Aimee Nickerson RN      Goal: Get HbA1C Level in Goal    Start Date: 5/19/2023   This Visit's Progress: 0%      Task: Educate patient on diabetes education self-management topics Completed 5/19/2023   Responsible User: Aimee Nickerson RN      Task: Educate patient on benefits of regular glucose monitoring Completed 5/19/2023   Responsible User: Aimee Nickerson RN      Task: Refer patient to appropriate extended care team member, as needed (Medication Therapy Management, Behavioral Health, Physical Therapy, etc.)    Responsible User: Aimee Nickerson RN      Task: Discuss diabetes treatment plan with patient Completed 5/19/2023   Responsible User: Aimee Nickerson RN      Problem: Diabetes Self-Management Education Needed to Optimize Self-Care Behaviors       Goal: Understand diabetes pathophysiology and disease progression    Start Date: 5/19/2023   This Visit's Progress: 30%      Task: Provide education on diabetes pathophysiology and disease progression specfic to patient's diabetes type Completed 5/19/2023   Responsible User: Aimee Nickerson RN      Goal: Healthy Eating - follow a healthy eating pattern for diabetes    Start Date: 5/19/2023   This Visit's Progress: 50%      Task: Provide education on portion control and consistency in amount, composition and timing of food intake Completed 5/19/2023   Responsible User: Aimee Nickerson RN      Task: Provide education on managing carbohydrate intake (carbohydrate counting, plate planning method, etc.) Completed 5/19/2023   Responsible User: Aimee Nickerson RN      Task: Provide education on weight management    Responsible User: Aimee Nickerson RN      Task: Provide education on heart healthy eating  Completed 5/19/2023   Responsible User: Aimee Nickerson RN      Task: Provide education on eating out Completed 5/19/2023   Responsible User: Aimee Nickerson RN      Task: Develop individualized healthy eating plan with patient Completed 5/19/2023   Responsible User: Aimee Nickerson RN      Goal: Being Active - get regular physical activity, working up to at least 150 minutes per week    Start Date: 5/19/2023   This Visit's Progress: 100%      Task: Provide education on relationship of activity to glucose and precautions to take if at risk for low glucose Completed 5/19/2023   Responsible User: Aimee Nickerson RN      Task: Discuss barriers to physical activity with patient Completed 5/19/2023   Responsible User: Aimee Nickerson RN      Task: Develop physical activity plan with patient Completed 5/19/2023   Responsible User: Aimee Nickerson RN      Task: Explore community resources including walking groups, assistance programs, and home videos    Responsible User: Aimee Nickerson RN      Goal: Monitoring - monitor glucose and ketones as directed    Start Date: 5/19/2023   This Visit's Progress: 80%      Task: Provide education on blood glucose monitoring (purpose, proper technique, frequency, glucose targets, interpreting results, when to use glucose control solution, sharps disposal) Completed 5/19/2023   Responsible User: Amiee Nickerson RN      Task: Provide education on continuous glucose monitoring (sensor placement, use of agustin or /reader, understanding glucose trends, alerts and alarms, differences between sensor glucose and blood glucose) Completed 5/19/2023   Responsible User: Aimee Nickerson RN      Task: Provide education on ketone monitoring (when to monitor, frequency, etc.)    Responsible User: Aimee Nickerson RN      Goal: Taking Medication - patient is consistently taking medications as directed    Start Date: 5/19/2023   This Visit's Progress: 50%      Task: Provide education on action of  prescribed medication, including when to take and possible side effects Completed 5/19/2023   Responsible User: Aimee Nickerson RN      Task: Provide education on insulin and injectable diabetes medications, including administration, storage, site selection and rotation for injection sites Completed 5/19/2023   Responsible User: Aimee Nickerson RN      Task: Discuss barriers to medication adherence with patient and provide management technique ideas as appropriate Completed 5/19/2023   Responsible User: Aimee Nickerson RN      Task: Provide education on frequency and refill details of medications Completed 5/19/2023   Responsible User: Aimee Nickerson RN      Goal: Problem Solving - know how to prevent and manage short-term diabetes complications    Start Date: 5/19/2023   This Visit's Progress: 50%      Task: Provide education on high blood glucose - causes, signs/symptoms, prevention and treatment Completed 5/19/2023   Responsible User: Aimee Nickerson RN      Task: Provide education on low blood glucose - causes, signs/symptoms, prevention, treatment, carrying a carbohydrate source at all times, and medical identification Completed 5/19/2023   Responsible User: Aimee Nickerson RN      Task: Provide education on safe travel with diabetes    Responsible User: Aimee Nickerson RN      Task: Provide education on how to care for diabetes on sick days Completed 5/19/2023   Responsible User: Aimee Nickerson RN      Task: Provide education on when to call a health care provider Completed 5/19/2023   Responsible User: Aimee Nickerson RN      Goal: Reducing Risks - know how to prevent and treat long-term diabetes complications    Start Date: 5/19/2023   This Visit's Progress: 50%      Task: Provide education on major complications of diabetes, prevention, early diagnostic measures and treatment of complications Completed 5/19/2023   Responsible User: Aimee Nickerson RN      Task: Provide education on recommended care for  dental, eye and foot health Completed 5/19/2023   Responsible User: Aimee Nickerson RN      Task: Provide education on Hemoglobin A1c - goals and relationship to blood glucose levels Completed 5/19/2023   Responsible User: Aimee Nickerson RN      Task: Provide education on recommendations for heart health - lipid levels and goals, blood pressure and goals, and aspirin therapy, if indicated Completed 5/19/2023   Responsible User: Aimee Nickerson RN      Task: Provide education on tobacco cessation    Responsible User: Aimee Nickerson RN      Goal: Healthy Coping - use available resources to cope with the challenges of managing diabetes    Start Date: 5/19/2023   This Visit's Progress: 90%      Task: Discuss recognizing feelings about having diabetes Completed 5/19/2023   Responsible User: Aimee Nickerson RN      Task: Provide education on the benefits of making appropriate lifestyle changes Completed 5/19/2023   Responsible User: Aimee Nickerson RN      Task: Provide education on benefits of utilizing support systems Completed 5/19/2023   Responsible User: Aimee Nickerson RN      Task: Discuss methods for coping with stress Completed 5/19/2023   Responsible User: Aimee Nickerson RN      Task: Provide education on when to seek professional counseling    Responsible User: Aimee Nickerson RN Sue Truhler RN/PREMA Bravo Diabetes Educator      Time Spent: 90 minutes  Encounter Type: Individual    Any diabetes medication dose changes were made via the CDE Protocol per the patient's primary care provider. A copy of this encounter was shared with the provider.

## 2023-05-19 NOTE — PATIENT INSTRUCTIONS
Diabetes Support Resources:  Metformin  mg take 4 pills daily  Toujeo 22 units daily  Consider putting you on either Ozempic, Trulicity or Mounjaro.    3 meals per day with 3-5 carb choices, with proteins , and 2-3 snacks each day with 1-2 carb choices   Keep up exercises and stay hydrated.       Bring blood glucose meter and logbook with you to all doctor and follow-up appointments.    Diabetes Education Telephone Visit Follow-up:    We realize your time is valuable and your health is important! We offer a convenient Telephone Visit follow up! It s a quick way to check in for a medication dose adjustment without having to come back to clinic as soon.    Telephone Visits are often covered by insurance. Please check with your insurance plan to see if this type of visit is covered. If not, the cost is less expensive than an office visit:    Up to 10 minutes (Code 24724): $30  11-20 minutes (Code 55805): $59  More than 20 minutes (Code 17978): $85    Talk with your Diabetes Educator if you want to learn more.      [facility]:  For Your Diabetes Education and Nutrition Appointments Call:  [phone]   For Diabetes Education or Nutrition Related Questions:   Phone: [phone]  Send [employer] Message   If you need a medication refill please contact your pharmacy. Please allow 3 business days for your refills to be completed.

## 2023-05-19 NOTE — PROGRESS NOTES
Diabetes Self-Management Education & Support    Presents for: Initial Assessment for new diagnosis    Type of Service: In Person Visit    Assessment Type:   ASSESSMENT:  Ole is here newly diagnosed with diabetes he said he was told years ago that he was prediabetes follow-up and they said it was gone he has children that he is active with past history of alcoholism and he has not had alcohol in years many family members with diabetes and he is so glad he came in to see a doctor without the symptoms he had for diabetes he is well into learning as much as he can about diabetes and management and meal planning he has been cutting back on carbs avoiding them so I went over the plate planning method with him he is actively exercising and the  at his club also told him he is not eating enough carbohydrates for with the exercise and management for his diabetes, I did place a sensor on him today we will look and see how his blood sugars are over the weekend and possibly take him off of insulin and starting a GLP-1 for him when I follow-up with him he is a great candidate for this and I believe he will do very very well he does have children that are heading down the same road and he is hoping to give them a good example of how to treat his diabetes and to live a healthy life    Patient's most recent   Lab Results   Component Value Date    A1C 14.4 04/19/2023     is not meeting goal of <7.0    Diabetes knowledge and skills assessment:   Patient is knowledgeable in diabetes management concepts related to: Being Active, Monitoring, Taking Medication and Healthy Coping    Continue education with the following diabetes management concepts: Healthy Eating, Taking Medication, Problem Solving and Reducing Risks    Based on learning assessment above, most appropriate setting for further diabetes education would be: Group class setting.      PLAN    1. Metformin  mg take 4 pills daily  2. Toujeo 22 units  "daily  3. Consider putting you on either Ozempic, Trulicity or Mounjaro.    4. 3 meals per day with 3-5 carb choices, with proteins , and 2-3 snacks each day with 1-2 carb choices   5. Keep up exercises and stay hydrated.    Topics to cover at upcoming visits: Healthy Eating, Monitoring, Taking Medication and Problem Solving    Follow-up:     See Care Plan for co-developed, patient-state behavior change goals.  AVS provided for patient today.    Education Materials Provided:  [facility] Understanding Diabetes Booklet, Carbohydrate Counting and My Plate Planner      SUBJECTIVE/OBJECTIVE:  Presents for: Initial Assessment for new diagnosis  Accompanied by: Self  Diabetes education in the past 24mo: No  Focus of Visit: Diabetes Pathophysiology, Assistance w/ making life changes, Taking Medication, Healthy Eating  Diabetes type: Type 2  Disease course: Improving  How confident are you filling out medical forms by yourself:: Extremely  Transportation concerns: No  Difficulty affording diabetes medication?: Sometimes  Difficulty affording diabetes testing supplies?: No  Other concerns:: None  Cultural Influences/Ethnic Background:  Not  or       Diabetes Symptoms & Complications:  Fatigue: Sometimes  Neuropathy: Sometimes  Polydipsia: No  Polyphagia: Yes  Polyuria: No  Visual change: Yes  Slow healing wounds: Sometimes  Symptom course: Improving  Weight trend: Stable  Autonomic neuropathy: Other  CVA: No  Heart disease: No  Nephropathy: Other  Peripheral neuropathy: Other  Peripheral Vascular Disease: Other  Retinopathy: No  Sexual dysfunction: No    Patient Problem List and Family Medical History reviewed for relevant medical history, current medical status, and diabetes risk factors.    Vitals:  There were no vitals taken for this visit.  Estimated body mass index is 36.07 kg/m  as calculated from the following:    Height as of 4/19/23: 1.778 m (5' 10\").    Weight as of 4/19/23: 114 kg (251 lb 6.4 " oz).   Last 3 BP:   BP Readings from Last 3 Encounters:   04/19/23 136/84   10/08/19 (!) 146/80   08/19/14 149/49       History   Smoking Status     Former     Packs/day: 1.00     Types: Cigarettes     Quit date: 8/1/2022   Smokeless Tobacco     Never       Labs:  Lab Results   Component Value Date    A1C 14.4 04/19/2023     Lab Results   Component Value Date     04/19/2023     Lab Results   Component Value Date    LDL  04/19/2023      Comment:      Cannot estimate LDL when triglyceride exceeds 400 mg/dL    LDL 95 04/19/2023     Direct Measure HDL   Date Value Ref Range Status   04/19/2023 24 (L) >=40 mg/dL Final   ]  GFR Estimate   Date Value Ref Range Status   04/19/2023 >90 >60 mL/min/1.73m2 Final     Comment:     eGFR calculated using 2021 CKD-EPI equation.     No results found for: GFRESTBLACK  Lab Results   Component Value Date    CR 0.62 04/19/2023     No results found for: MICROALBUMIN    Healthy Eating:  Cultural/Yarsani diet restrictions?: No  Meal planning/habits: Avoiding sweets, Carb counting, Heart healthy, Low salt, Smaller portions, Plate planning method  How many times a week on average do you eat food made away from home (restaurant/take-out)?: 1  Breakfast: skips, Glycerna,  Lunch: skips  Dinner: chx, breast drummie and wing, and salad  Beverages: Water, Milk  Has patient met with a dietitian in the past?: No    Being Active:  Being Active Assessed Today: Yes  Exercise:: Yes  Days per week of moderate to strenuous exercise (like a brisk walk): 6  On average, minutes per day of exercise at this level: 150 or greater  How intense was your typical exercise? : Heavy (like jogging or swimming  Exercise Minutes per Week: 900  Barrier to exercise: Time, Access    Monitoring:  Monitoring Assessed Today: Yes  Did patient bring glucose meter to appointment? : Yes  Blood Glucose Meter: ContourNext  Times checking blood sugar at home (number): 2  Times checking blood sugar at home (per): Day  Blood  glucose trend: Decreasing        Taking Medications:  Diabetes Medication(s)     Biguanides       metFORMIN (GLUCOPHAGE XR) 500 MG 24 hr tablet    Take 2 tablets (1,000 mg) by mouth 2 times daily (with meals) Take 2 pills with breakfast and 2 pills with dinner    Insulin       insulin degludec (TRESIBA) 100 UNIT/ML pen    Take 22 units daily + use 2 unit prime with each dose for a total of 30 units/day     insulin glargine U-300 (TOUJEO SOLOSTAR) 300 UNIT/ML (1 units dial) pen    Inject 22 Units Subcutaneous At Bedtime          Current Treatments: Diet, Insulin Injections, Oral Medication (taken by mouth)  Dose schedule: At bedtime  Given by: Patient  Injection/Infusion sites: Abdomen  Problems taking diabetes medications regularly?: No  Diabetes medication side effects?: No    Problem Solving:  Problem Solving Assessed Today: No              Reducing Risks:  Diabetes Risks: Hypertriglyceridemia, Family History  CAD Risks: Diabetes Mellitus, Dyslipidemia, Family history, Stress, Male sex  Has dilated eye exam at least once a year?: No  Sees dentist every 6 months?: No  Feet checked by healthcare provider in the last year?: No    Healthy Coping:  Healthy Coping Assessed Today: Yes  Emotional response to diabetes: Ready to learn  Informal Support system:: Children, Family, Friends, Parent, Partner  Stage of change: PREPARATION (Decided to change - considering how)  Patient Activation Measure Survey Score:       View : No data to display.                  Care Plan and Education Provided:  Care Plan: Diabetes   Updates made by Aimee Nickerson RN since 5/19/2023 12:00 AM      Problem: HbA1C Not In Goal       Goal: Establish Regular Follow-Ups with PCP    Start Date: 5/19/2023   This Visit's Progress: 50%      Task: Discuss with PCP the recommended timing for patient's next follow up visit(s) Completed 5/19/2023   Responsible User: Aimee Nickerson RN      Task: Discuss schedule for PCP visits with patient Completed  5/19/2023   Responsible User: Aimee Nickerson RN      Goal: Get HbA1C Level in Goal    Start Date: 5/19/2023   This Visit's Progress: 0%      Task: Educate patient on diabetes education self-management topics Completed 5/19/2023   Responsible User: Aimee Nickerson RN      Task: Educate patient on benefits of regular glucose monitoring Completed 5/19/2023   Responsible User: Aimee Nickerson RN      Task: Refer patient to appropriate extended care team member, as needed (Medication Therapy Management, Behavioral Health, Physical Therapy, etc.)    Responsible User: Aimee Nickerson RN      Task: Discuss diabetes treatment plan with patient Completed 5/19/2023   Responsible User: Aimee Nickerson RN      Problem: Diabetes Self-Management Education Needed to Optimize Self-Care Behaviors       Goal: Understand diabetes pathophysiology and disease progression    Start Date: 5/19/2023   This Visit's Progress: 30%      Task: Provide education on diabetes pathophysiology and disease progression specfic to patient's diabetes type Completed 5/19/2023   Responsible User: Aimee Nickerson RN      Goal: Healthy Eating - follow a healthy eating pattern for diabetes    Start Date: 5/19/2023   This Visit's Progress: 50%      Task: Provide education on portion control and consistency in amount, composition and timing of food intake Completed 5/19/2023   Responsible User: Aimee Nickerson RN      Task: Provide education on managing carbohydrate intake (carbohydrate counting, plate planning method, etc.) Completed 5/19/2023   Responsible User: Aimee Nickerson RN      Task: Provide education on weight management    Responsible User: Aimee Nickerson RN      Task: Provide education on heart healthy eating Completed 5/19/2023   Responsible User: Aimee Nickerson RN      Task: Provide education on eating out Completed 5/19/2023   Responsible User: Aimee Nickerson RN      Task: Develop individualized healthy eating plan with patient Completed  5/19/2023   Responsible User: Aimee Nickerson RN      Goal: Being Active - get regular physical activity, working up to at least 150 minutes per week    Start Date: 5/19/2023   This Visit's Progress: 100%      Task: Provide education on relationship of activity to glucose and precautions to take if at risk for low glucose Completed 5/19/2023   Responsible User: Aimee Nickerson RN      Task: Discuss barriers to physical activity with patient Completed 5/19/2023   Responsible User: Aimee Nickerson RN      Task: Develop physical activity plan with patient Completed 5/19/2023   Responsible User: Aimee Nickerson RN      Task: Explore community resources including walking groups, assistance programs, and home videos    Responsible User: Aimee Nickerson RN      Goal: Monitoring - monitor glucose and ketones as directed    Start Date: 5/19/2023   This Visit's Progress: 80%      Task: Provide education on blood glucose monitoring (purpose, proper technique, frequency, glucose targets, interpreting results, when to use glucose control solution, sharps disposal) Completed 5/19/2023   Responsible User: Aimee Nickerson RN      Task: Provide education on continuous glucose monitoring (sensor placement, use of agustin or /reader, understanding glucose trends, alerts and alarms, differences between sensor glucose and blood glucose) Completed 5/19/2023   Responsible User: Aimee Nickerson RN      Task: Provide education on ketone monitoring (when to monitor, frequency, etc.)    Responsible User: Aimee Nickerson RN      Goal: Taking Medication - patient is consistently taking medications as directed    Start Date: 5/19/2023   This Visit's Progress: 50%      Task: Provide education on action of prescribed medication, including when to take and possible side effects Completed 5/19/2023   Responsible User: Aimee Nickerson RN      Task: Provide education on insulin and injectable diabetes medications, including administration,  storage, site selection and rotation for injection sites Completed 5/19/2023   Responsible User: Aimee Nickerson RN      Task: Discuss barriers to medication adherence with patient and provide management technique ideas as appropriate Completed 5/19/2023   Responsible User: Aimee Nickerson RN      Task: Provide education on frequency and refill details of medications Completed 5/19/2023   Responsible User: Aimee Nickerson RN      Goal: Problem Solving - know how to prevent and manage short-term diabetes complications    Start Date: 5/19/2023   This Visit's Progress: 50%      Task: Provide education on high blood glucose - causes, signs/symptoms, prevention and treatment Completed 5/19/2023   Responsible User: Aimee Nickerson RN      Task: Provide education on low blood glucose - causes, signs/symptoms, prevention, treatment, carrying a carbohydrate source at all times, and medical identification Completed 5/19/2023   Responsible User: Aimee Nickerson RN      Task: Provide education on safe travel with diabetes    Responsible User: Aimee Nickerson RN      Task: Provide education on how to care for diabetes on sick days Completed 5/19/2023   Responsible User: Aimee Nickerson RN      Task: Provide education on when to call a health care provider Completed 5/19/2023   Responsible User: Aimee Nickerson RN      Goal: Reducing Risks - know how to prevent and treat long-term diabetes complications    Start Date: 5/19/2023   This Visit's Progress: 50%      Task: Provide education on major complications of diabetes, prevention, early diagnostic measures and treatment of complications Completed 5/19/2023   Responsible User: Aimee Nickerson RN      Task: Provide education on recommended care for dental, eye and foot health Completed 5/19/2023   Responsible User: Aimee Nickerson RN      Task: Provide education on Hemoglobin A1c - goals and relationship to blood glucose levels Completed 5/19/2023   Responsible User: Liya  EVA Yu      Task: Provide education on recommendations for heart health - lipid levels and goals, blood pressure and goals, and aspirin therapy, if indicated Completed 5/19/2023   Responsible User: Aimee Nickerson RN      Task: Provide education on tobacco cessation    Responsible User: Aimee Nickerson RN      Goal: Healthy Coping - use available resources to cope with the challenges of managing diabetes    Start Date: 5/19/2023   This Visit's Progress: 90%      Task: Discuss recognizing feelings about having diabetes Completed 5/19/2023   Responsible User: Aimee Nickerson RN      Task: Provide education on the benefits of making appropriate lifestyle changes Completed 5/19/2023   Responsible User: Aimee Nickerson RN      Task: Provide education on benefits of utilizing support systems Completed 5/19/2023   Responsible User: Aimee Nickerson RN      Task: Discuss methods for coping with stress Completed 5/19/2023   Responsible User: Aimee Nickerson RN      Task: Provide education on when to seek professional counseling    Responsible User: Aimee Nickerson RN Sue Truhler RN/PREMA  [place] [facility] Educator      Time Spent: 90 minutes  Encounter Type: Individual    Any diabetes medication dose changes were made via the CDE Protocol per the patient's primary care provider. A copy of this encounter was shared with the provider.

## 2023-05-24 ENCOUNTER — TELEPHONE (OUTPATIENT)
Dept: FAMILY MEDICINE | Facility: CLINIC | Age: 43
End: 2023-05-24

## 2023-05-24 ENCOUNTER — OFFICE VISIT (OUTPATIENT)
Dept: FAMILY MEDICINE | Facility: CLINIC | Age: 43
End: 2023-05-24
Payer: COMMERCIAL

## 2023-05-24 VITALS
HEART RATE: 71 BPM | TEMPERATURE: 97.9 F | BODY MASS INDEX: 36.08 KG/M2 | RESPIRATION RATE: 16 BRPM | WEIGHT: 252 LBS | OXYGEN SATURATION: 99 % | HEIGHT: 70 IN | DIASTOLIC BLOOD PRESSURE: 88 MMHG | SYSTOLIC BLOOD PRESSURE: 138 MMHG

## 2023-05-24 DIAGNOSIS — E11.9 ENCOUNTER FOR DIABETIC FOOT EXAM (H): ICD-10-CM

## 2023-05-24 DIAGNOSIS — E66.01 MORBID OBESITY (H): Primary | ICD-10-CM

## 2023-05-24 DIAGNOSIS — E78.1 HYPERTRIGLYCERIDEMIA: ICD-10-CM

## 2023-05-24 DIAGNOSIS — E11.9 TYPE 2 DIABETES MELLITUS WITHOUT COMPLICATION, UNSPECIFIED WHETHER LONG TERM INSULIN USE (H): ICD-10-CM

## 2023-05-24 LAB
ALBUMIN UR-MCNC: NEGATIVE MG/DL
APPEARANCE UR: CLEAR
BILIRUB UR QL STRIP: NEGATIVE
COLOR UR AUTO: YELLOW
GLUCOSE UR STRIP-MCNC: NEGATIVE MG/DL
HBA1C MFR BLD: 9.7 % (ref 0–5.6)
HGB UR QL STRIP: NEGATIVE
KETONES UR STRIP-MCNC: NEGATIVE MG/DL
LEUKOCYTE ESTERASE UR QL STRIP: NEGATIVE
NITRATE UR QL: NEGATIVE
PH UR STRIP: 6 [PH] (ref 5–7)
SP GR UR STRIP: 1.02 (ref 1–1.03)
UROBILINOGEN UR STRIP-ACNC: 0.2 E.U./DL

## 2023-05-24 PROCEDURE — 99214 OFFICE O/P EST MOD 30 MIN: CPT | Performed by: PHYSICIAN ASSISTANT

## 2023-05-24 PROCEDURE — 83036 HEMOGLOBIN GLYCOSYLATED A1C: CPT | Performed by: PHYSICIAN ASSISTANT

## 2023-05-24 PROCEDURE — 36415 COLL VENOUS BLD VENIPUNCTURE: CPT | Performed by: PHYSICIAN ASSISTANT

## 2023-05-24 PROCEDURE — 80061 LIPID PANEL: CPT | Performed by: PHYSICIAN ASSISTANT

## 2023-05-24 PROCEDURE — 80053 COMPREHEN METABOLIC PANEL: CPT | Performed by: PHYSICIAN ASSISTANT

## 2023-05-24 PROCEDURE — 81003 URINALYSIS AUTO W/O SCOPE: CPT | Performed by: PHYSICIAN ASSISTANT

## 2023-05-24 PROCEDURE — 82570 ASSAY OF URINE CREATININE: CPT | Performed by: PHYSICIAN ASSISTANT

## 2023-05-24 PROCEDURE — 82043 UR ALBUMIN QUANTITATIVE: CPT | Performed by: PHYSICIAN ASSISTANT

## 2023-05-24 PROCEDURE — 99207 PR FOOT EXAM NO CHARGE: CPT | Performed by: PHYSICIAN ASSISTANT

## 2023-05-24 RX ORDER — ATORVASTATIN CALCIUM 40 MG/1
40 TABLET, FILM COATED ORAL DAILY
Qty: 90 TABLET | Refills: 1 | Status: SHIPPED | OUTPATIENT
Start: 2023-05-24 | End: 2023-12-04

## 2023-05-24 ASSESSMENT — PAIN SCALES - GENERAL: PAINLEVEL: NO PAIN (0)

## 2023-05-24 NOTE — TELEPHONE ENCOUNTER
Per Neema Nickerson, Diab Edu. Patient no longer needs this med.     Thank You  Kelly Mensah, [facility]  [phone]

## 2023-05-24 NOTE — TELEPHONE ENCOUNTER
Prior Authorization Retail Medication Request    Medication/Dose: Tresiba Flextouch 100unit/ml  ICD code (if different than what is on RX):    Previously Tried and Failed:    Rationale:      Insurance Name:  [facility]  Insurance ID:  [id number]      Thank You  Kelly Mensah, [facility]  [phone]

## 2023-05-24 NOTE — PROGRESS NOTES
Assessment & Plan     (E66.01) Morbid obesity (H)  (primary encounter diagnosis)  Comment: Patient with elevated BMI.  Is working on healthy lifestyle modifications.  Does endorse weight loss with recent changes.  I encouraged the patient continue with his healthy lifestyle modifications.    (E11.9) Type 2 diabetes mellitus without complication, unspecified whether long term insulin use (H)  Comment: History of type 2 diabetes.  Is currently on metformin as well as Toujeo.  Patient has nearly completed the Toujeo medication.  Follows closely with diabetic educator.  Discussed recheck of labs patient was amenable to this.  We will follow-up in 3 months for recheck.  Plan: Albumin Random Urine Quantitative with Creat         Ratio, Adult Eye  Referral, UA         Macroscopic with reflex to Microscopic and         Culture, **Hemoglobin A1c FUTURE 3mo,         Hemoglobin A1c, FOOT EXAM, Comprehensive         metabolic panel (BMP + Alb, Alk Phos, ALT, AST,        Total. Bili, TP)          (E78.1) Hypertriglyceridemia  Comment: History of hypertriglyceridemia.  Has been taking both fibrate as well as statin medication.  Patient also added omega-3 supplement to his regimen.  Discussed with patient holding a fibrate medication given he is taking both a statin and the omega-3's.  We will recheck cholesterol panel.  Plan: Lipid panel reflex to direct LDL Non-fasting,         atorvastatin (LIPITOR) 40 MG tablet           (E11.9) Encounter for diabetic foot exam (H)  Comment: Diabetic foot exam performed.  No concerning findings.  Normal monofilament examination.  Plan: FOOT EXAM        SAL[facility]ADOR [facility] ABDULLAHI Handy is a 42 year old, presenting for the following health issues:  Diabetes and Follow Up         View : No data to display.              History of Present Illness       Reason for visit:  Appointment    He eats 2-3 servings of fruits and vegetables  "daily.He consumes 0 sweetened beverage(s) daily.He exercises with enough effort to increase his heart rate 60 or more minutes per day.  He exercises with enough effort to increase his heart rate 6 days per week.   He is taking medications regularly.     Patient presents for recheck of diabetes.  Patient has been tolerating metformin without any significant side effects and has increased dose to 1000 mg twice daily.  Had been using Toujeo daily over the last month.  Overall reports his highest glucose 149 and his lowest was 98 in the last month.  Patient has significantly altered diet as well as healthy exercise regimens.  He has been closely monitoring sugars as well as overall food choices.  Denies any side effects of his medications.  No muscle aches.  Has been having more energy.  Overall feels his mood is much improved also.  Has followed with diabetic educator.  Further, patient was also started taking supplements including omega-3's.        Review of Systems   Constitutional, HEENT, cardiovascular, pulmonary, gi and gu systems are negative, except as otherwise noted.      Objective    /88   Pulse 71   Temp 97.9  F (36.6  C) (Tympanic)   Resp 16   Ht 1.778 m (5' 10\")   Wt 114.3 kg (252 lb)   SpO2 99%   BMI 36.16 kg/m    Body mass index is 36.16 kg/m .  Physical Exam   GENERAL: healthy, alert and no distress  EYES: Eyes grossly normal to inspection, PERRL and conjunctivae and sclerae normal  HENT: normal cephalic/atraumatic, nose and mouth without ulcers or lesions, oropharynx clear and oral mucous membranes moist  RESP: lungs clear to auscultation - no rales, rhonchi or wheezes  CV: regular rate and rhythm, normal S1 S2, no S3 or S4, no murmur, click or rub, no peripheral edema and peripheral pulses strong  ABDOMEN: soft, nontender, no hepatosplenomegaly, no masses and bowel sounds normal  MS: no gross musculoskeletal defects noted, no edema  Diabetic foot exam: normal DP and PT pulses, no trophic " changes or ulcerative lesions, normal sensory exam and normal monofilament exam

## 2023-05-25 ENCOUNTER — TELEPHONE (OUTPATIENT)
Dept: FAMILY MEDICINE | Facility: CLINIC | Age: 43
End: 2023-05-25
Payer: COMMERCIAL

## 2023-05-25 ENCOUNTER — DOCUMENTATION ONLY (OUTPATIENT)
Dept: EDUCATION SERVICES | Facility: CLINIC | Age: 43
End: 2023-05-25
Payer: COMMERCIAL

## 2023-05-25 LAB
ALBUMIN SERPL-MCNC: 4.2 G/DL (ref 3.4–5)
ALP SERPL-CCNC: 71 U/L (ref 40–150)
ALT SERPL W P-5'-P-CCNC: 42 U/L (ref 0–70)
ANION GAP SERPL CALCULATED.3IONS-SCNC: 3 MMOL/L (ref 3–14)
AST SERPL W P-5'-P-CCNC: 31 U/L (ref 0–45)
BILIRUB SERPL-MCNC: 0.4 MG/DL (ref 0.2–1.3)
BUN SERPL-MCNC: 19 MG/DL (ref 7–30)
CALCIUM SERPL-MCNC: 9.3 MG/DL (ref 8.5–10.1)
CHLORIDE BLD-SCNC: 107 MMOL/L (ref 94–109)
CHOLEST SERPL-MCNC: 114 MG/DL
CO2 SERPL-SCNC: 29 MMOL/L (ref 20–32)
CREAT SERPL-MCNC: 0.91 MG/DL (ref 0.66–1.25)
CREAT UR-MCNC: 87 MG/DL
FASTING STATUS PATIENT QL REPORTED: NO
GFR SERPL CREATININE-BSD FRML MDRD: >90 ML/MIN/1.73M2
GLUCOSE BLD-MCNC: 91 MG/DL (ref 70–99)
HDLC SERPL-MCNC: 44 MG/DL
LDLC SERPL CALC-MCNC: 53 MG/DL
MICROALBUMIN UR-MCNC: 66 MG/L
MICROALBUMIN/CREAT UR: 75.86 MG/G CR (ref 0–17)
NONHDLC SERPL-MCNC: 70 MG/DL
POTASSIUM BLD-SCNC: 4.4 MMOL/L (ref 3.4–5.3)
PROT SERPL-MCNC: 8 G/DL (ref 6.8–8.8)
SODIUM SERPL-SCNC: 139 MMOL/L (ref 133–144)
TRIGL SERPL-MCNC: 83 MG/DL

## 2023-05-25 RX ORDER — LISINOPRIL 5 MG/1
5 TABLET ORAL DAILY
Qty: 30 TABLET | Refills: 0 | Status: SHIPPED | OUTPATIENT
Start: 2023-05-25 | End: 2023-06-19

## 2023-05-25 NOTE — TELEPHONE ENCOUNTER
"Writer had called to speak with patient today regarding lab results from visit with PCP on 5/24/23.  During conversation, patient had question about the Tresiba and wanted update or follow up on if should be taking this medication any longer.    Patient stated he had a conversation with Diabetes Educator, Neema KILGORE about this medication and was under impression that DE was going to verify or confirm with PCP about discontinuing this medication or not. Patient is still waiting to hear outcome of this.  Per review of chart, can see in the TE in Chart Review from yesterday about Prior Authorization for Tresiba, it is noted that \"per Diabetic Ed, patient no longer needs this med\".    Routing to provider and DE to please clarify regarding Tresiba and if patient to be taking or not.  Thank you.      Chanell Castillo RN  Clinical Triage/Primary Care  [facility]          "

## 2023-05-25 NOTE — PROGRESS NOTES
Patient has been calling not sure what to do about his insulin.  I had talked with his PCP and discussed that Ole's BG were stable and he could discontinue his insulin usage.  Patient states he did not know what to do.   Per his CGM he is stable enough to discontinue his insulin at this time.            I am calling patient to let him know that he can stop using insulin now.  To watch his BG over the next few days, but he should do well.  We can discuss using a GLP-1 in the near future.    Neema Nickerson RN/OMARE  [place] [facility] Educator

## 2023-05-31 ENCOUNTER — MYC MEDICAL ADVICE (OUTPATIENT)
Dept: EDUCATION SERVICES | Facility: CLINIC | Age: 43
End: 2023-05-31
Payer: COMMERCIAL

## 2023-05-31 DIAGNOSIS — E11.9 TYPE 2 DIABETES MELLITUS WITHOUT COMPLICATION, UNSPECIFIED WHETHER LONG TERM INSULIN USE (H): ICD-10-CM

## 2023-06-01 NOTE — TELEPHONE ENCOUNTER
See routing comment below, from CDE team. Message was sent to writer only, not care team. Writer just viewed message now today:    Poonam Rosario RD  to Me    HW    5/25/23  3:59 PM  I confirmed with Neema and he is to stop the Tresiba.       Poonam Rosario MS, RD, LD, CDE       Appears CDE team reached out to patient via Luminate message on 5/25/23 to advise patient doesn't need to be on Tresiba.      Chanell Castillo RN  Clinical Triage/Primary Care  [facility]

## 2023-06-02 RX ORDER — BLOOD-GLUCOSE SENSOR
1 EACH MISCELLANEOUS CONTINUOUS
Qty: 6 EACH | Refills: 11 | Status: SHIPPED | OUTPATIENT
Start: 2023-06-02 | End: 2024-01-12

## 2023-06-08 ENCOUNTER — TELEPHONE (OUTPATIENT)
Dept: EDUCATION SERVICES | Facility: CLINIC | Age: 43
End: 2023-06-08

## 2023-06-08 DIAGNOSIS — Z91.199 NO-SHOW FOR APPOINTMENT: Primary | ICD-10-CM

## 2023-06-12 ENCOUNTER — MEDICAL CORRESPONDENCE (OUTPATIENT)
Dept: HEALTH INFORMATION MANAGEMENT | Facility: CLINIC | Age: 43
End: 2023-06-12
Payer: COMMERCIAL

## 2023-06-22 ENCOUNTER — OFFICE VISIT (OUTPATIENT)
Dept: OPTOMETRY | Facility: CLINIC | Age: 43
End: 2023-06-22
Attending: PHYSICIAN ASSISTANT
Payer: COMMERCIAL

## 2023-06-22 DIAGNOSIS — Z01.01 VISION EXAM WITH ABNORMAL FINDINGS: Primary | ICD-10-CM

## 2023-06-22 DIAGNOSIS — H52.4 PRESBYOPIA: ICD-10-CM

## 2023-06-22 DIAGNOSIS — H53.9 CHANGE IN VISION: ICD-10-CM

## 2023-06-22 DIAGNOSIS — H52.223 REGULAR ASTIGMATISM OF BOTH EYES: ICD-10-CM

## 2023-06-22 DIAGNOSIS — H53.021 REFRACTIVE AMBLYOPIA OF RIGHT EYE: ICD-10-CM

## 2023-06-22 DIAGNOSIS — E11.39 TYPE 2 DIABETES MELLITUS WITH OTHER OPHTHALMIC COMPLICATION, WITHOUT LONG-TERM CURRENT USE OF INSULIN (H): ICD-10-CM

## 2023-06-22 PROCEDURE — 92004 COMPRE OPH EXAM NEW PT 1/>: CPT | Performed by: OPTOMETRIST

## 2023-06-22 PROCEDURE — 92015 DETERMINE REFRACTIVE STATE: CPT | Performed by: OPTOMETRIST

## 2023-06-22 ASSESSMENT — REFRACTION_WEARINGRX
OD_CYLINDER: +6.50
OD_AXIS: 085
OS_AXIS: 092
OS_CYLINDER: +5.75
OS_SPHERE: -5.75
OD_SPHERE: -7.25

## 2023-06-22 ASSESSMENT — REFRACTION_MANIFEST
OS_CYLINDER: +4.00
OS_AXIS: 092
METHOD_AUTOREFRACTION: 1
OS_SPHERE: -4.25
OD_ADD: +1.50
OS_SPHERE: -3.75
OD_AXIS: 083
OD_CYLINDER: +5.25
OD_AXIS: 083
OD_SPHERE: -4.75
OS_CYLINDER: +4.25
OD_SPHERE: -5.00
OS_ADD: +1.50
OD_CYLINDER: +5.25
OS_AXIS: 093

## 2023-06-22 ASSESSMENT — CONF VISUAL FIELD
OD_SUPERIOR_NASAL_RESTRICTION: 0
METHOD: COUNTING FINGERS
OS_INFERIOR_NASAL_RESTRICTION: 0
OS_SUPERIOR_NASAL_RESTRICTION: 0
OD_INFERIOR_NASAL_RESTRICTION: 0
OS_INFERIOR_TEMPORAL_RESTRICTION: 0
OD_INFERIOR_TEMPORAL_RESTRICTION: 0
OD_NORMAL: 1
OD_SUPERIOR_TEMPORAL_RESTRICTION: 0
OS_SUPERIOR_TEMPORAL_RESTRICTION: 0
OS_NORMAL: 1

## 2023-06-22 ASSESSMENT — VISUAL ACUITY
OD_SC: 20/80
OD_SC: 20/100
METHOD: SNELLEN - LINEAR
OS_PH_SC+: -3
OD_PH_SC+: -1
OD_SC+: -2
OS_PH_SC: 20/40
OS_SC: 20/70
OS_SC: 20/50
OD_PH_SC: 20/40

## 2023-06-22 ASSESSMENT — KERATOMETRY
OD_AXISANGLE2_DEGREES: 171
OS_K2POWER_DIOPTERS: 46.75
OS_K1POWER_DIOPTERS: 43.00
OD_K2POWER_DIOPTERS: 47.25
OS_AXISANGLE2_DEGREES: 4
OD_K1POWER_DIOPTERS: 42.75

## 2023-06-22 ASSESSMENT — CUP TO DISC RATIO
OD_RATIO: 0.3
OS_RATIO: 0.3

## 2023-06-22 ASSESSMENT — TONOMETRY
OD_IOP_MMHG: 12
IOP_METHOD: APPLANATION
OS_IOP_MMHG: 12

## 2023-06-22 ASSESSMENT — SLIT LAMP EXAM - LIDS
COMMENTS: NORMAL
COMMENTS: NORMAL

## 2023-06-22 NOTE — LETTER
6/22/2023         RE: Ole Joseph  [address] [place] [address]        Dear Colleague,    Thank you for referring your patient, Ole Joseph, to the [facility].  Due to elevated blood sugar from diabetes his glasses prescription is fluctuating.  No retinal diabetic retinopathy.  Advised to RTC in 3 months for refraction.  Importance of good control stressed in order to protect his vision.Please see a copy of my visit note below.    Chief Complaint   Patient presents with     Diabetic Eye Exam        Chief Complaint(s) and History of Present Illness(es)     Diabetic Eye Exam            Vision: is worsening    Diabetes Type: Type 2, controlled with diet and taking oral medications    Blood Sugars: fluctuates               Lab Results   Component Value Date    A1C 9.7 05/24/2023    A1C 14.4 04/19/2023        1.5 weeks bs was under     Last Eye Exam: 1 year ago, [facility]   Dilated Previously: Yes, but not for years     What are you currently using to see?  Glasses, has them but has been unable to wear them. The vision isn't correct     Distance Vision Acuity: Noticed sudden change in both eyes, suddenly glasses don't work. Also mentioned that he usually wears contacts, does not want a fitting until he gets his vision squared away. Mentioned that he ordered a lot of contacts (elsewhere) that he is unable to use currently     Near Vision Acuity: Not satisfied     Eye Comfort: feels good, just blurry   Do you use eye drops? : Yes: Uses drops or his CL solution because he's in dust all day   Occupation or Hobbies: Irrigation - runs a crew, he deals with wires and people     Patrica Olivas Optometric Assistant      Medical, surgical and family histories reviewed and updated 6/22/2023.       OBJECTIVE: See Ophthalmology exam    ASSESSMENT:    ICD-10-CM    1. Vision exam with abnormal findings  Z01.01 EYE EXAM (SIMPLE-NONBILLABLE)     REFRACTION      2. Type 2 diabetes mellitus with  other ophthalmic complication, without long-term current use of insulin (H)  E11.39 EYE EXAM (SIMPLE-NONBILLABLE)     REFRACTION      3. Change in vision- refractive shift due to elevated blood sugar levels   H53.9 EYE EXAM (SIMPLE-NONBILLABLE)     REFRACTION      4. Presbyopia  H52.4 EYE EXAM (SIMPLE-NONBILLABLE)     REFRACTION      5. Regular astigmatism of both eyes  H52.223 EYE EXAM (SIMPLE-NONBILLABLE)     REFRACTION      6. Refractive amblyopia of right eye  H53.021 EYE EXAM (SIMPLE-NONBILLABLE)     REFRACTION          PLAN:    Ole Joseph aware  eye exam results will be sent to Thierno Farmer  Patient Instructions   Fill glasses prescription  Allow 2 weeks to adapt to change in glasses    Work on improving blood sugar control  Return in 1 year for diabetic eye exam  If poor vision with new glasses , and prior contact lenses- then return to clinic for refraction appointment  (3 months)      Blood sugar and blood pressure control are very important in the prevention of ocular complications from diabetes.       Thu Andrea, OD  [phone]                        Again, thank you for allowing me to participate in the care of your patient.        Sincerely,        Thu Andrea, OD

## 2023-06-22 NOTE — PATIENT INSTRUCTIONS
Fill glasses prescription  Allow 2 weeks to adapt to change in glasses    Work on improving blood sugar control  Return in 1 year for diabetic eye exam  If poor vision with new glasses , and prior contact lenses- then return to clinic for refraction appointment  (3 months)      Blood sugar and blood pressure control are very important in the prevention of ocular complications from diabetes.       Thu Andrea, OD  [phone]

## 2023-06-22 NOTE — PROGRESS NOTES
Chief Complaint   Patient presents with     Diabetic Eye Exam        Chief Complaint(s) and History of Present Illness(es)     Diabetic Eye Exam            Vision: is worsening    Diabetes Type: Type 2, controlled with diet and taking oral medications    Blood Sugars: fluctuates               Lab Results   Component Value Date    A1C 9.7 05/24/2023    A1C 14.4 04/19/2023        1.5 weeks bs was under     Last Eye Exam: 1 year ago, [facility]   Dilated Previously: Yes, but not for years     What are you currently using to see?  Glasses, has them but has been unable to wear them. The vision isn't correct     Distance Vision Acuity: Noticed sudden change in both eyes, suddenly glasses don't work. Also mentioned that he usually wears contacts, does not want a fitting until he gets his vision squared away. Mentioned that he ordered a lot of contacts (elsewhere) that he is unable to use currently     Near Vision Acuity: Not satisfied     Eye Comfort: feels good, just blurry   Do you use eye drops? : Yes: Uses drops or his CL solution because he's in dust all day   Occupation or Hobbies: Irrigation - runs a crew, he deals with wires and people     Patrica Olivas Optometric Assistant      Medical, surgical and family histories reviewed and updated 6/22/2023.       OBJECTIVE: See Ophthalmology exam    ASSESSMENT:    ICD-10-CM    1. Vision exam with abnormal findings  Z01.01 EYE EXAM (SIMPLE-NONBILLABLE)     REFRACTION      2. Type 2 diabetes mellitus with other ophthalmic complication, without long-term current use of insulin (H)  E11.39 EYE EXAM (SIMPLE-NONBILLABLE)     REFRACTION      3. Change in vision- refractive shift due to elevated blood sugar levels   H53.9 EYE EXAM (SIMPLE-NONBILLABLE)     REFRACTION      4. Presbyopia  H52.4 EYE EXAM (SIMPLE-NONBILLABLE)     REFRACTION      5. Regular astigmatism of both eyes  H52.223 EYE EXAM (SIMPLE-NONBILLABLE)     REFRACTION      6. Refractive amblyopia of right eye  H53.021  EYE EXAM (SIMPLE-NONBILLABLE)     REFRACTION          PLAN:    Ole Joseph aware  eye exam results will be sent to Thierno Farmer  Patient Instructions   Fill glasses prescription  Allow 2 weeks to adapt to change in glasses    Work on improving blood sugar control  Return in 1 year for diabetic eye exam  If poor vision with new glasses , and prior contact lenses- then return to clinic for refraction appointment  (3 months)      Blood sugar and blood pressure control are very important in the prevention of ocular complications from diabetes.       Thu Andrea, OD  [phone]

## 2023-07-07 ENCOUNTER — APPOINTMENT (OUTPATIENT)
Dept: OPTOMETRY | Facility: CLINIC | Age: 43
End: 2023-07-07
Payer: COMMERCIAL

## 2023-07-07 PROCEDURE — 92340 FIT SPECTACLES MONOFOCAL: CPT | Performed by: OPTOMETRIST

## 2023-07-10 ASSESSMENT — REFRACTION_CURRENTRX
OD_CYLINDER: -5.75
OD_AXIS: 175
OD_SPHERE: -0.75
OS_SPHERE: PLANO
OS_CYLINDER: -5.25
OS_AXIS: 180

## 2023-07-12 DIAGNOSIS — E11.9 TYPE 2 DIABETES MELLITUS WITHOUT COMPLICATION, UNSPECIFIED WHETHER LONG TERM INSULIN USE (H): ICD-10-CM

## 2023-07-12 RX ORDER — LISINOPRIL 5 MG/1
5 TABLET ORAL DAILY
Qty: 90 TABLET | Refills: 0 | Status: SHIPPED | OUTPATIENT
Start: 2023-07-12 | End: 2023-10-10

## 2023-10-10 DIAGNOSIS — E11.9 TYPE 2 DIABETES MELLITUS WITHOUT COMPLICATION, UNSPECIFIED WHETHER LONG TERM INSULIN USE (H): ICD-10-CM

## 2023-10-10 DIAGNOSIS — E55.9 VITAMIN D DEFICIENCY: ICD-10-CM

## 2023-10-10 RX ORDER — LISINOPRIL 5 MG/1
5 TABLET ORAL DAILY
Qty: 90 TABLET | Refills: 0 | Status: SHIPPED | OUTPATIENT
Start: 2023-10-10 | End: 2023-12-04

## 2023-10-10 RX ORDER — VITAMIN B COMPLEX
25 TABLET ORAL DAILY
Qty: 90 TABLET | Refills: 1 | Status: SHIPPED | OUTPATIENT
Start: 2023-10-10 | End: 2024-01-12

## 2023-10-29 ENCOUNTER — HEALTH MAINTENANCE LETTER (OUTPATIENT)
Age: 43
End: 2023-10-29

## 2023-11-09 DIAGNOSIS — E11.9 TYPE 2 DIABETES MELLITUS WITHOUT COMPLICATION, UNSPECIFIED WHETHER LONG TERM INSULIN USE (H): ICD-10-CM

## 2023-11-09 RX ORDER — METFORMIN HCL 500 MG
1000 TABLET, EXTENDED RELEASE 24 HR ORAL 2 TIMES DAILY WITH MEALS
Qty: 120 TABLET | Refills: 0 | Status: SHIPPED | OUTPATIENT
Start: 2023-11-09 | End: 2023-12-04

## 2023-12-04 ENCOUNTER — OFFICE VISIT (OUTPATIENT)
Dept: FAMILY MEDICINE | Facility: CLINIC | Age: 43
End: 2023-12-04
Payer: COMMERCIAL

## 2023-12-04 VITALS
HEART RATE: 85 BPM | TEMPERATURE: 97.5 F | SYSTOLIC BLOOD PRESSURE: 126 MMHG | BODY MASS INDEX: 37.08 KG/M2 | RESPIRATION RATE: 20 BRPM | OXYGEN SATURATION: 98 % | WEIGHT: 259 LBS | HEIGHT: 70 IN | DIASTOLIC BLOOD PRESSURE: 74 MMHG

## 2023-12-04 DIAGNOSIS — F34.1 DYSTHYMIA: ICD-10-CM

## 2023-12-04 DIAGNOSIS — E11.69 TYPE 2 DIABETES MELLITUS WITH OTHER SPECIFIED COMPLICATION, WITH LONG-TERM CURRENT USE OF INSULIN (H): Primary | ICD-10-CM

## 2023-12-04 DIAGNOSIS — Z79.4 TYPE 2 DIABETES MELLITUS WITH OTHER SPECIFIED COMPLICATION, WITH LONG-TERM CURRENT USE OF INSULIN (H): Primary | ICD-10-CM

## 2023-12-04 DIAGNOSIS — E11.9 TYPE 2 DIABETES MELLITUS WITHOUT COMPLICATION, UNSPECIFIED WHETHER LONG TERM INSULIN USE (H): ICD-10-CM

## 2023-12-04 DIAGNOSIS — F41.1 GAD (GENERALIZED ANXIETY DISORDER): ICD-10-CM

## 2023-12-04 DIAGNOSIS — E78.1 HYPERTRIGLYCERIDEMIA: ICD-10-CM

## 2023-12-04 LAB — HBA1C MFR BLD: 7.2 % (ref 0–5.6)

## 2023-12-04 PROCEDURE — 83036 HEMOGLOBIN GLYCOSYLATED A1C: CPT | Performed by: PHYSICIAN ASSISTANT

## 2023-12-04 PROCEDURE — 80048 BASIC METABOLIC PNL TOTAL CA: CPT | Performed by: PHYSICIAN ASSISTANT

## 2023-12-04 PROCEDURE — 90686 IIV4 VACC NO PRSV 0.5 ML IM: CPT | Performed by: PHYSICIAN ASSISTANT

## 2023-12-04 PROCEDURE — 84443 ASSAY THYROID STIM HORMONE: CPT | Performed by: PHYSICIAN ASSISTANT

## 2023-12-04 PROCEDURE — 90677 PCV20 VACCINE IM: CPT | Performed by: PHYSICIAN ASSISTANT

## 2023-12-04 PROCEDURE — 99214 OFFICE O/P EST MOD 30 MIN: CPT | Mod: 25 | Performed by: PHYSICIAN ASSISTANT

## 2023-12-04 PROCEDURE — 36415 COLL VENOUS BLD VENIPUNCTURE: CPT | Performed by: PHYSICIAN ASSISTANT

## 2023-12-04 PROCEDURE — 90472 IMMUNIZATION ADMIN EACH ADD: CPT | Performed by: PHYSICIAN ASSISTANT

## 2023-12-04 PROCEDURE — 90471 IMMUNIZATION ADMIN: CPT | Performed by: PHYSICIAN ASSISTANT

## 2023-12-04 RX ORDER — LISINOPRIL 5 MG/1
5 TABLET ORAL DAILY
Qty: 90 TABLET | Refills: 3 | Status: SHIPPED | OUTPATIENT
Start: 2023-12-04 | End: 2024-01-12

## 2023-12-04 RX ORDER — ESCITALOPRAM OXALATE 10 MG/1
TABLET ORAL
Qty: 60 TABLET | Refills: 1 | Status: SHIPPED | OUTPATIENT
Start: 2023-12-04

## 2023-12-04 RX ORDER — METFORMIN HCL 500 MG
1000 TABLET, EXTENDED RELEASE 24 HR ORAL 2 TIMES DAILY WITH MEALS
Qty: 360 TABLET | Refills: 1 | Status: SHIPPED | OUTPATIENT
Start: 2023-12-04 | End: 2024-01-12

## 2023-12-04 RX ORDER — ATORVASTATIN CALCIUM 40 MG/1
40 TABLET, FILM COATED ORAL DAILY
Qty: 90 TABLET | Refills: 1 | Status: SHIPPED | OUTPATIENT
Start: 2023-12-04 | End: 2024-01-12

## 2023-12-04 RX ORDER — PIOGLITAZONEHYDROCHLORIDE 30 MG/1
30 TABLET ORAL DAILY
Qty: 90 TABLET | Refills: 0 | Status: SHIPPED | OUTPATIENT
Start: 2023-12-04 | End: 2024-01-12

## 2023-12-04 ASSESSMENT — ANXIETY QUESTIONNAIRES
IF YOU CHECKED OFF ANY PROBLEMS ON THIS QUESTIONNAIRE, HOW DIFFICULT HAVE THESE PROBLEMS MADE IT FOR YOU TO DO YOUR WORK, TAKE CARE OF THINGS AT HOME, OR GET ALONG WITH OTHER PEOPLE: VERY DIFFICULT
2. NOT BEING ABLE TO STOP OR CONTROL WORRYING: NEARLY EVERY DAY
7. FEELING AFRAID AS IF SOMETHING AWFUL MIGHT HAPPEN: NEARLY EVERY DAY
5. BEING SO RESTLESS THAT IT IS HARD TO SIT STILL: NEARLY EVERY DAY
4. TROUBLE RELAXING: NEARLY EVERY DAY
GAD7 TOTAL SCORE: 21
1. FEELING NERVOUS, ANXIOUS, OR ON EDGE: NEARLY EVERY DAY
6. BECOMING EASILY ANNOYED OR IRRITABLE: NEARLY EVERY DAY
3. WORRYING TOO MUCH ABOUT DIFFERENT THINGS: NEARLY EVERY DAY
GAD7 TOTAL SCORE: 21

## 2023-12-04 ASSESSMENT — PAIN SCALES - GENERAL: PAINLEVEL: MILD PAIN (2)

## 2023-12-04 NOTE — PROGRESS NOTES
Bandar Handy is a 43 year old, presenting for the following health issues:  Diabetes (recheck) and Health Maintenance (Will discuss vaccines with Rashad Hebert)        12/4/2023     3:57 PM   Additional Questions   Roomed by Monique Gunter CMA   Accompanied by daughter - Martha         12/4/2023     3:57 PM   Patient Reported Additional Medications   Patient reports taking the following new medications none       History of Present Illness       Mental Health Follow-up:  Patient presents to follow-up on Depression & Anxiety.Patient's depression since last visit has been:  Medium  The patient is not having other symptoms associated with depression.  Patient's anxiety since last visit has been:  Medium  The patient is not having other symptoms associated with anxiety.  Any significant life events: relationship concerns, financial concerns and housing concerns  Patient is not feeling anxious or having panic attacks.  Patient has no concerns about alcohol or drug use.    Diabetes:   He presents for follow up of diabetes.  He is checking home blood glucose a few times a month.   He checks blood glucose at bedtime.  Blood glucose is sometimes over 200 and sometimes under 70. He is aware of hypoglycemia symptoms including shakiness, dizziness, weakness, lethargy, blurred vision and confusion.   He is concerned about blood sugar frequently over 200.   He is having numbness in feet, redness, sores, or blisters on feet, blurry vision and weight gain.            He eats 0-1 servings of fruits and vegetables daily.He consumes 4 sweetened beverage(s) daily.He exercises with enough effort to increase his heart rate 60 or more minutes per day.  He exercises with enough effort to increase his heart rate 5 days per week. He is missing 1 dose(s) of medications per week.     No neuropathy symptoms  No chest pain/sob/palpitations/dizziness/ha's  No vision changes.     Some depression and anxiety. Stressors. Some anhedonia.  "Starting counseling.   Review of Systems   Constitutional, HEENT, cardiovascular, pulmonary, GI, , musculoskeletal, neuro, skin, endocrine and psych systems are negative, except as otherwise noted.      Objective    /74   Pulse 85   Temp 97.5  F (36.4  C) (Temporal)   Resp 20   Ht 1.784 m (5' 10.25\")   Wt 117.5 kg (259 lb)   SpO2 98%   BMI 36.90 kg/m    Body mass index is 36.9 kg/m .  Physical Exam   Eye exam - right eye normal lid, conjunctiva, cornea, pupil and fundus, left eye normal lid, conjunctiva, cornea, pupil and fundus.  Thyroid not palpable, not enlarged, no nodules detected.  CHEST:chest clear to IPPA, no tachypnea, retractions or cyanosis, and S1, S2 normal, no murmur, no gallop, rate regular.  Foot exam - both sides normal; no swelling, tenderness or skin or vascular lesions. Color and temperature is normal. Sensation is intact. Peripheral pulses are palpable. Toenails are normal.      Ole was seen today for diabetes and health maintenance.    Diagnoses and all orders for this visit:    Type 2 diabetes mellitus with other specified complication, with long-term current use of insulin (H)  -     HEMOGLOBIN A1C; Future  -     TSH with free T4 reflex; Future  -     Basic metabolic panel  (Ca, Cl, CO2, Creat, Gluc, K, Na, BUN); Future  -     HEMOGLOBIN A1C  -     TSH with free T4 reflex  -     Basic metabolic panel  (Ca, Cl, CO2, Creat, Gluc, K, Na, BUN)    Hypertriglyceridemia  -     atorvastatin (LIPITOR) 40 MG tablet; Take 1 tablet (40 mg) by mouth daily    Type 2 diabetes mellitus without complication, unspecified whether long term insulin use (H)  -     lisinopril (ZESTRIL) 5 MG tablet; Take 1 tablet (5 mg) by mouth daily  -     metFORMIN (GLUCOPHAGE XR) 500 MG 24 hr tablet; Take 2 tablets (1,000 mg) by mouth 2 times daily (with meals) Take 2 pills with breakfast and 2 pills with dinner. Needs appointment for further refills.  -     pioglitazone (ACTOS) 30 MG tablet; Take 1 tablet (30 " mg) by mouth daily    AIMEE (generalized anxiety disorder)  -     escitalopram (LEXAPRO) 10 MG tablet; Take 1/2 tab daily for 1 week, then increase to 1 full tab daily thereafter    Dysthymia  -     escitalopram (LEXAPRO) 10 MG tablet; Take 1/2 tab daily for 1 week, then increase to 1 full tab daily thereafter    Other orders  -     Pneumococcal 20 Valent Conjugate (Prevnar 20)  -     INFLUENZA VACCINE IM > 6 MONTHS VALENT IIV4 (AFLURIA/FLUZONE)      work on lifestyle modification  Exercise  Lower starch/sugar diet.  Recheck in 3-4 mos.

## 2023-12-04 NOTE — COMMUNITY RESOURCES LIST (ENGLISH)
12/04/2023   [facility]  N/A  For questions about this resource list or additional care needs, please contact your primary care clinic or care manager.  Phone: [phone]   Email: N/A   Address: [address] [place] [id number]   Hours: N/A        Financial Stability       Rent and mortgage payment assistance  1  [facility]  Office - [facility] - Family Homeless Prevention Assistance Project (PA) Distance: 5.3 miles      Phone/Virtual   [address] [place] [address]  Language: English  Hours: Mon - Fri 8:30 AM - 12:00 PM , Mon - Fri 1:00 PM - 4:00 PM  Fees: Free   Phone: (865) 729-9284 Email: deon@[facility] HospitalTodoCast TV.[employer] Website: https://www.Former[facility] Adventist HospitalVeronica.org/usn/     2  [employer] [facility] [employer] Distance: 10.03 miles      Phone/Virtual   [address] [place] [address]  Language: English  Hours: Mon - Fri 9:00 AM - 5:00 PM  Fees: Free   Phone: (844) 371-6780 Email: pacxicbkn8otdarer@[employer]X Website: https://sultfoybd4hulxltr.org/          Food and Nutrition       Food pantry  3  [facility] Distance: 0.56 miles      In-Person   [address] [place] [address]  Language: English  Hours: Tue 2:30 PM - 5:30 PM  Fees: Free   Phone: (895) 106-3487 Email: office@Vis[facility] TechnologySt. Luke's Meridian Medical CenterLikeList.[employer] Software Website: http://www.Reqlut.org/     4  [facility] - Emergency Food Shelf Distance: 2.24 miles      In-Person   [address] [place] [address]  Language: English  Hours: Thu 10:00 AM - 12:00 PM  Fees: Free   Phone: (442) 503-6309 Email: hipolitoMerit Health Rankin@[email or web address].Petenko Website: http://shahbazMerit Health Rankin.org     SNAP application assistance  5  [facility] Economic Assistance Department Distance: 5.29 miles      Phone/Virtual   [address] [place] [address]  Language: English  Hours: Mon - Fri 8:15 AM - 4:00 PM  Fees: Free   Phone: (708) 645-3615 Email: paperwork@[place]. Website:  http://www.Push ComputingcoLovelace Medical CenterTVShow Time./193/Economic-Assistance     6  [facility]  [employer] [facility] (AIFW) Distance: 9.8 miles      In-Person, Phone/Virtual   [address] [place] [address]  Language: Hungarian, Divehi, English, Gujarati, Jurgen, Divehi, Korean, Malay, Estonian, Chinese  Hours: Mon - Wed 9:00 AM - 5:00 PM , Thu 12:00 PM - 6:00 PM , Fri 9:00 AM - 5:00 PM , Sun 10:30 AM - 2:00 PM Appt. Only  Fees: Free   Phone: (869) 501-7113 Email: info@[facility] HospitalMo-DVw.org Website: https://www.[facility] State HospitalMo-DVw.org/     Soup kitchen or free meals  7  [facility] - Family Table Meals - Family Table Meal Distance: 4.39 miles      Pickup   [address] [place] [address]  Language: English  Hours: Thu 5:00 PM - 6:30 PM  Fees: Free   Phone: (114) 852-1736 Email: Nevro@[facility] HospitalThalchemyWashington Health System.[employer] Website: http://www.Undo SoftwareWashington Health System.Biosynthetic Technologies     8  [facility] - Family Table Meal Distance: 4.66 miles      In-Person, Pickup   [address] [place] [address]  Language: English  Hours: Thu 5:30 PM - 6:30 PM  Fees: Free   Phone: (359) 451-6058 Email: office@[employer].org Website: http://www.Sideband Networksr.org          Important Numbers & Websites       Emergency Services   911  City Services   311  Poison Control   (411) 311-3635  Suicide Prevention Lifeline   (511) 145-3070 (TALK)  Child Abuse Hotline   (335) 932-7162 (4-A-Child)  Sexual Assault Hotline   (626) 629-1741 (HOPE)  National Runaway Safeline   (202) 991-3717 (RUNAWAY)  All-Options Talkline   (744) 504-2265  Substance Abuse Referral   (376) 856-2443 (HELP)

## 2023-12-05 LAB
ANION GAP SERPL CALCULATED.3IONS-SCNC: 11 MMOL/L (ref 7–15)
BUN SERPL-MCNC: 11.2 MG/DL (ref 6–20)
CALCIUM SERPL-MCNC: 9.8 MG/DL (ref 8.6–10)
CHLORIDE SERPL-SCNC: 102 MMOL/L (ref 98–107)
CREAT SERPL-MCNC: 0.81 MG/DL (ref 0.67–1.17)
DEPRECATED HCO3 PLAS-SCNC: 26 MMOL/L (ref 22–29)
EGFRCR SERPLBLD CKD-EPI 2021: >90 ML/MIN/1.73M2
GLUCOSE SERPL-MCNC: 110 MG/DL (ref 70–99)
POTASSIUM SERPL-SCNC: 4.9 MMOL/L (ref 3.4–5.3)
SODIUM SERPL-SCNC: 139 MMOL/L (ref 135–145)
TSH SERPL DL<=0.005 MIU/L-ACNC: 2.79 UIU/ML (ref 0.3–4.2)

## 2024-01-12 ENCOUNTER — MYC REFILL (OUTPATIENT)
Dept: EDUCATION SERVICES | Facility: CLINIC | Age: 44
End: 2024-01-12
Payer: COMMERCIAL

## 2024-01-12 ENCOUNTER — MYC REFILL (OUTPATIENT)
Dept: FAMILY MEDICINE | Facility: CLINIC | Age: 44
End: 2024-01-12
Payer: COMMERCIAL

## 2024-01-12 DIAGNOSIS — E11.9 TYPE 2 DIABETES MELLITUS WITHOUT COMPLICATION, UNSPECIFIED WHETHER LONG TERM INSULIN USE (H): ICD-10-CM

## 2024-01-12 DIAGNOSIS — F41.1 GAD (GENERALIZED ANXIETY DISORDER): ICD-10-CM

## 2024-01-12 DIAGNOSIS — F34.1 DYSTHYMIA: ICD-10-CM

## 2024-01-12 DIAGNOSIS — E78.1 HYPERTRIGLYCERIDEMIA: ICD-10-CM

## 2024-01-12 DIAGNOSIS — E55.9 VITAMIN D DEFICIENCY: ICD-10-CM

## 2024-01-12 RX ORDER — METFORMIN HCL 500 MG
1000 TABLET, EXTENDED RELEASE 24 HR ORAL 2 TIMES DAILY WITH MEALS
Qty: 360 TABLET | Refills: 1 | Status: SHIPPED | OUTPATIENT
Start: 2024-01-12

## 2024-01-12 RX ORDER — PIOGLITAZONEHYDROCHLORIDE 30 MG/1
30 TABLET ORAL DAILY
Qty: 90 TABLET | Refills: 0 | Status: SHIPPED | OUTPATIENT
Start: 2024-01-12 | End: 2024-06-10

## 2024-01-12 RX ORDER — LISINOPRIL 5 MG/1
5 TABLET ORAL DAILY
Qty: 90 TABLET | Refills: 3 | Status: SHIPPED | OUTPATIENT
Start: 2024-01-12

## 2024-01-12 RX ORDER — ESCITALOPRAM OXALATE 10 MG/1
TABLET ORAL
Qty: 60 TABLET | Refills: 1 | OUTPATIENT
Start: 2024-01-12

## 2024-01-12 RX ORDER — VITAMIN B COMPLEX
25 TABLET ORAL DAILY
Qty: 90 TABLET | Refills: 1 | Status: SHIPPED | OUTPATIENT
Start: 2024-01-12

## 2024-01-12 RX ORDER — ATORVASTATIN CALCIUM 40 MG/1
40 TABLET, FILM COATED ORAL DAILY
Qty: 90 TABLET | Refills: 1 | Status: SHIPPED | OUTPATIENT
Start: 2024-01-12

## 2024-01-12 RX ORDER — BLOOD-GLUCOSE SENSOR
1 EACH MISCELLANEOUS CONTINUOUS
Qty: 6 EACH | Refills: 11 | Status: SHIPPED | OUTPATIENT
Start: 2024-01-12

## 2024-03-17 ENCOUNTER — HEALTH MAINTENANCE LETTER (OUTPATIENT)
Age: 44
End: 2024-03-17

## 2024-03-20 ENCOUNTER — PATIENT OUTREACH (OUTPATIENT)
Dept: CARE COORDINATION | Facility: CLINIC | Age: 44
End: 2024-03-20
Payer: COMMERCIAL

## 2024-04-03 ENCOUNTER — PATIENT OUTREACH (OUTPATIENT)
Dept: CARE COORDINATION | Facility: CLINIC | Age: 44
End: 2024-04-03
Payer: COMMERCIAL

## 2024-05-26 ENCOUNTER — HEALTH MAINTENANCE LETTER (OUTPATIENT)
Age: 44
End: 2024-05-26

## 2024-06-10 DIAGNOSIS — E11.9 TYPE 2 DIABETES MELLITUS WITHOUT COMPLICATION, UNSPECIFIED WHETHER LONG TERM INSULIN USE (H): ICD-10-CM

## 2024-06-11 RX ORDER — PIOGLITAZONEHYDROCHLORIDE 30 MG/1
30 TABLET ORAL DAILY
Qty: 30 TABLET | Refills: 0 | Status: SHIPPED | OUTPATIENT
Start: 2024-06-11

## 2024-08-04 ENCOUNTER — HEALTH MAINTENANCE LETTER (OUTPATIENT)
Age: 44
End: 2024-08-04

## 2024-10-16 ENCOUNTER — PATIENT OUTREACH (OUTPATIENT)
Dept: CARE COORDINATION | Facility: CLINIC | Age: 44
End: 2024-10-16
Payer: COMMERCIAL

## 2024-12-21 ENCOUNTER — HEALTH MAINTENANCE LETTER (OUTPATIENT)
Age: 44
End: 2024-12-21

## 2025-03-22 ENCOUNTER — HEALTH MAINTENANCE LETTER (OUTPATIENT)
Age: 45
End: 2025-03-22

## 2025-06-09 ENCOUNTER — APPOINTMENT (OUTPATIENT)
Dept: CT IMAGING | Facility: CLINIC | Age: 45
End: 2025-06-09
Attending: EMERGENCY MEDICINE
Payer: COMMERCIAL

## 2025-06-09 ENCOUNTER — HOSPITAL ENCOUNTER (EMERGENCY)
Facility: CLINIC | Age: 45
Discharge: HOME | End: 2025-06-09
Attending: EMERGENCY MEDICINE | Admitting: EMERGENCY MEDICINE
Payer: COMMERCIAL

## 2025-06-09 ENCOUNTER — APPOINTMENT (OUTPATIENT)
Dept: MRI IMAGING | Facility: CLINIC | Age: 45
End: 2025-06-09
Attending: EMERGENCY MEDICINE
Payer: COMMERCIAL

## 2025-06-09 VITALS
SYSTOLIC BLOOD PRESSURE: 139 MMHG | TEMPERATURE: 97.8 F | BODY MASS INDEX: 36.19 KG/M2 | WEIGHT: 254 LBS | RESPIRATION RATE: 19 BRPM | HEART RATE: 61 BPM | DIASTOLIC BLOOD PRESSURE: 85 MMHG | OXYGEN SATURATION: 96 %

## 2025-06-09 DIAGNOSIS — R29.810 WEAKNESS ON RIGHT SIDE OF FACE: ICD-10-CM

## 2025-06-09 DIAGNOSIS — G51.0 BELL'S PALSY: ICD-10-CM

## 2025-06-09 LAB
ALBUMIN SERPL BCG-MCNC: 4 G/DL (ref 3.5–5.2)
ALP SERPL-CCNC: 109 U/L (ref 40–150)
ALT SERPL W P-5'-P-CCNC: 28 U/L (ref 0–70)
ANION GAP SERPL CALCULATED.3IONS-SCNC: 11 MMOL/L (ref 7–15)
AST SERPL W P-5'-P-CCNC: 15 U/L (ref 0–45)
BASOPHILS # BLD AUTO: 0.1 10E3/UL (ref 0–0.2)
BASOPHILS NFR BLD AUTO: 1 %
BILIRUB SERPL-MCNC: 0.2 MG/DL
BUN SERPL-MCNC: 12.2 MG/DL (ref 6–20)
CALCIUM SERPL-MCNC: 9.2 MG/DL (ref 8.8–10.4)
CHLORIDE SERPL-SCNC: 105 MMOL/L (ref 98–107)
CREAT SERPL-MCNC: 0.72 MG/DL (ref 0.67–1.17)
EGFRCR SERPLBLD CKD-EPI 2021: >90 ML/MIN/1.73M2
EOSINOPHIL # BLD AUTO: 0.2 10E3/UL (ref 0–0.7)
EOSINOPHIL NFR BLD AUTO: 3 %
ERYTHROCYTE [DISTWIDTH] IN BLOOD BY AUTOMATED COUNT: 12.5 % (ref 10–15)
GLUCOSE SERPL-MCNC: 202 MG/DL (ref 70–99)
HCO3 SERPL-SCNC: 23 MMOL/L (ref 22–29)
HCT VFR BLD AUTO: 43.4 % (ref 40–53)
HGB BLD-MCNC: 14.6 G/DL (ref 13.3–17.7)
IMM GRANULOCYTES # BLD: 0 10E3/UL
IMM GRANULOCYTES NFR BLD: 0 %
LYMPHOCYTES # BLD AUTO: 2 10E3/UL (ref 0.8–5.3)
LYMPHOCYTES NFR BLD AUTO: 28 %
MCH RBC QN AUTO: 29.1 PG (ref 26.5–33)
MCHC RBC AUTO-ENTMCNC: 33.6 G/DL (ref 31.5–36.5)
MCV RBC AUTO: 87 FL (ref 78–100)
MONOCYTES # BLD AUTO: 0.4 10E3/UL (ref 0–1.3)
MONOCYTES NFR BLD AUTO: 5 %
NEUTROPHILS # BLD AUTO: 4.5 10E3/UL (ref 1.6–8.3)
NEUTROPHILS NFR BLD AUTO: 63 %
NRBC # BLD AUTO: 0 10E3/UL
NRBC BLD AUTO-RTO: 0 /100
PLATELET # BLD AUTO: 248 10E3/UL (ref 150–450)
POTASSIUM SERPL-SCNC: 4 MMOL/L (ref 3.4–5.3)
PROT SERPL-MCNC: 7 G/DL (ref 6.4–8.3)
RBC # BLD AUTO: 5.02 10E6/UL (ref 4.4–5.9)
SODIUM SERPL-SCNC: 139 MMOL/L (ref 135–145)
WBC # BLD AUTO: 7.1 10E3/UL (ref 4–11)

## 2025-06-09 PROCEDURE — 85025 COMPLETE CBC W/AUTO DIFF WBC: CPT | Performed by: EMERGENCY MEDICINE

## 2025-06-09 PROCEDURE — 70450 CT HEAD/BRAIN W/O DYE: CPT

## 2025-06-09 PROCEDURE — 250N000011 HC RX IP 250 OP 636: Performed by: EMERGENCY MEDICINE

## 2025-06-09 PROCEDURE — 250N000009 HC RX 250: Performed by: EMERGENCY MEDICINE

## 2025-06-09 PROCEDURE — 99291 CRITICAL CARE FIRST HOUR: CPT | Mod: 25

## 2025-06-09 PROCEDURE — 70551 MRI BRAIN STEM W/O DYE: CPT

## 2025-06-09 PROCEDURE — 84132 ASSAY OF SERUM POTASSIUM: CPT | Performed by: EMERGENCY MEDICINE

## 2025-06-09 PROCEDURE — 70496 CT ANGIOGRAPHY HEAD: CPT

## 2025-06-09 PROCEDURE — 36415 COLL VENOUS BLD VENIPUNCTURE: CPT | Performed by: EMERGENCY MEDICINE

## 2025-06-09 RX ORDER — PREDNISONE 20 MG/1
60 TABLET ORAL DAILY
Qty: 21 TABLET | Refills: 0 | Status: SHIPPED | OUTPATIENT
Start: 2025-06-09 | End: 2025-06-16

## 2025-06-09 RX ORDER — VALACYCLOVIR HYDROCHLORIDE 1 G/1
1000 TABLET, FILM COATED ORAL 3 TIMES DAILY
Qty: 21 TABLET | Refills: 0 | Status: SHIPPED | OUTPATIENT
Start: 2025-06-09 | End: 2025-06-16

## 2025-06-09 RX ORDER — LISINOPRIL 10 MG/1
TABLET ORAL
COMMUNITY
Start: 2025-05-13

## 2025-06-09 RX ORDER — IOPAMIDOL 755 MG/ML
67 INJECTION, SOLUTION INTRAVASCULAR ONCE
Status: COMPLETED | OUTPATIENT
Start: 2025-06-09 | End: 2025-06-09

## 2025-06-09 RX ADMIN — SODIUM CHLORIDE 100 ML: 9 INJECTION, SOLUTION INTRAVENOUS at 15:58

## 2025-06-09 RX ADMIN — IOPAMIDOL 67 ML: 755 INJECTION, SOLUTION INTRAVENOUS at 15:58

## 2025-06-09 ASSESSMENT — COLUMBIA-SUICIDE SEVERITY RATING SCALE - C-SSRS
1. IN THE PAST MONTH, HAVE YOU WISHED YOU WERE DEAD OR WISHED YOU COULD GO TO SLEEP AND NOT WAKE UP?: NO
6. HAVE YOU EVER DONE ANYTHING, STARTED TO DO ANYTHING, OR PREPARED TO DO ANYTHING TO END YOUR LIFE?: NO
2. HAVE YOU ACTUALLY HAD ANY THOUGHTS OF KILLING YOURSELF IN THE PAST MONTH?: NO

## 2025-06-09 ASSESSMENT — ACTIVITIES OF DAILY LIVING (ADL)
ADLS_ACUITY_SCORE: 41

## 2025-06-09 NOTE — ED PROVIDER NOTES
Emergency Department Note      History of Present Illness     Chief Complaint   Numbness      HPI   Ole Joseph is a 44 year old male who presents to the ED with right-sided facial weakness and numbness.  The patient states he noted the symptoms began at about 1321 this afternoon.  No history of similar symptoms.  The patient denies trauma.  No exposures to chemicals at work.  This is not happened in the past.  No new medication changes.  The patient also complains of right-sided leg weakness    Independent Historian   None    Review of External Notes   I reviewed the emergency Emergency Department note from 4/11/2020 for    Past Medical History     Medical History and Problem List   Past Medical History:   Diagnosis Date    Astigmatism, bilateral     Diabetes (H)     Hypertension     Refractive amblyopia of right eye 1984       Medications   lisinopril (ZESTRIL) 10 MG tablet  alcohol swab prep pads  atorvastatin (LIPITOR) 40 MG tablet  blood glucose (NO BRAND SPECIFIED) test strip  blood glucose monitoring (NO BRAND SPECIFIED) meter device kit  Continuous Blood Gluc Sensor (FREESTYLE YARELY 3 SENSOR) MISC  escitalopram (LEXAPRO) 10 MG tablet  lisinopril (ZESTRIL) 5 MG tablet  metFORMIN (GLUCOPHAGE XR) 500 MG 24 hr tablet  pioglitazone (ACTOS) 30 MG tablet  thin (NO BRAND SPECIFIED) lancets  Vitamin D3 (CHOLECALCIFEROL) 25 mcg (1000 units) tablet        Surgical History   History reviewed. No pertinent surgical history.    Physical Exam     Patient Vitals for the past 24 hrs:   BP Temp Temp src Pulse Resp SpO2 Weight   06/09/25 1438 (!) 162/91 97.8  F (36.6  C) Temporal 86 18 99 % 115.2 kg (254 lb)     Physical Exam  General: Alert, No distress. Nontoxic appearance  Head: No signs of trauma.   Mouth/Throat: Oropharynx moist.   Eyes: Conjunctivae are normal. Pupils are equal..   Neck: Normal range of motion.    CV: Appears well perfused.  Resp:No respiratory distress.   MSK: Normal range of motion. No obvious  deformity.   Skin: No lesion or sign of trauma noted.   Psych: normal mood and affect. behavior is normal.   Neuro Exam:  PERRLA, EOMI, visual fields intact  Patient has mild right-sided facial weakness involving his smile, eye closing, and forehead raising.  strength in upper/lower extremities normal and symetrical.  No drift.  Sensation normal.  Finger nose finger, rapid alternating movements normal   Rhomberg deferred  Speech normal  Gait deferred  GCS 15      Diagnostics     Lab Results   Labs Ordered and Resulted from Time of ED Arrival to Time of ED Departure   COMPREHENSIVE METABOLIC PANEL - Abnormal       Result Value    Sodium 139      Potassium 4.0      Carbon Dioxide (CO2) 23      Anion Gap 11      Urea Nitrogen 12.2      Creatinine 0.72      GFR Estimate >90      Calcium 9.2      Chloride 105      Glucose 202 (*)     Alkaline Phosphatase 109      AST 15      ALT 28      Protein Total 7.0      Albumin 4.0      Bilirubin Total 0.2     CBC WITH PLATELETS AND DIFFERENTIAL    WBC Count 7.1      RBC Count 5.02      Hemoglobin 14.6      Hematocrit 43.4      MCV 87      MCH 29.1      MCHC 33.6      RDW 12.5      Platelet Count 248      % Neutrophils 63      % Lymphocytes 28      % Monocytes 5      % Eosinophils 3      % Basophils 1      % Immature Granulocytes 0      NRBCs per 100 WBC 0      Absolute Neutrophils 4.5      Absolute Lymphocytes 2.0      Absolute Monocytes 0.4      Absolute Eosinophils 0.2      Absolute Basophils 0.1      Absolute Immature Granulocytes 0.0      Absolute NRBCs 0.0         Imaging   Head CT w/o contrast   Final Result   IMPRESSION:   No acute intracranial injury, hemorrhage, mass, or CT evidence of recent ischemia.         CTA Head Neck with Contrast   Final Result   CONCLUSION:   HEAD CTA:   Negative. No vessel stenosis, occlusion or aneurysm.      NECK CTA:   1.  Mild noncalcified atheromatous plaque of the proximal left internal carotid artery. Stenosis is less than 50% by NASCET  criteria.   2.  No atherosclerotic change of the right carotid artery.   3.  Patent vertebral arteries. No dissection.             Independent Interpretation   CT Head: No intracranial hemorrhage.    ED Course      Medications Administered   Medications - No data to display    Procedures   Procedures     Discussion of Management   None    ED Course        Additional Documentation  None    Medical Decision Making / Diagnosis     CMS Diagnoses: None    MIPS   None    MDM   Ole Joseph is a 44 year old male who presents for evaluation of right unilateral facial weakness. While the most likely etiology considered was Bell's Palsy, nonetheless a broad differential was considered including CVA (especially brainstem CVA), Lyme disease manifestation, neuropathy associated with HTN, HIV, DM, Bullock-Hunt syndrome, lymphoma, sarcoidosis, brain tumor, etc.   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  I will initiate steroid therapy.  There is no indication for antivirals in this case.  I discussed with the patient normal Bell's Palsy care including eye moisture, taping eye shut at night, etc.  We discussed the natural history of the disease and that not all patients make a full recovery from this.    They will follow up with their doctor, earlier if eye pain develops.  Return here for progressive symptoms as this is unlikely but possible to represent early Guillain-Roscoe syndrome.  Anticipatory guidance given prior to discharge.      Disposition   Care of the patient was transferred to my colleague Dr. Arrieta pending MRI results.     Diagnosis     ICD-10-CM    1. Weakness on right side of face  R29.810       2. Bell's palsy  G51.0            Discharge Medications   Discharge Medication List as of 6/9/2025  6:17 PM        START taking these medications    Details   predniSONE  (DELTASONE) 20 MG tablet Take 3 tablets (60 mg) by mouth daily for 7 days., Disp-21 tablet, R-0, Local Print      valACYclovir (VALTREX) 1000 mg tablet Take 1 tablet (1,000 mg) by mouth 3 times daily for 7 days., Disp-21 tablet, R-0, Local Print                      Alden Jay MD  06/09/25 [id number]

## 2025-06-09 NOTE — ED NOTES
Pt presents to room reporting onset of neck pain at work today. Pt reports the neck pain radiating up to his posterior head. Pt states that around that time he noticed difficulty closing his right eye as well as decreased sensation to the right side of his face. Pt reports tingling to his right leg today as well. Pt then reports numbness to his tongue for the past 2 days. Pt has decreased sensation to the right side of his face in the room on arrival but reports normal sensation to his right leg in the room.

## 2025-06-09 NOTE — ED TRIAGE NOTES
Tongue numbness for 2-3 days, today having some neck pain, then had right sided facial numbness noted at 1321 today. Droop noted to right side of mouth. Pt is diabetic and was off his meds for last 5-6 days.

## 2025-06-09 NOTE — ED PROVIDER NOTES
Sign Out Note    I took over care of this patient from Dr. Jay    Briefly, patient presented to the ED for: right facial weakness    Plan at time of sign out: MRI Brain, discharge if negative, presumed Bell's palsy    Events during my shift: MRI resulted:  MR Brain w/o Contrast   Final Result   IMPRESSION:   1.  No discrete mass lesion, hemorrhage or focal area suggestive of acute ischemia.   2.  No abnormal signal brain parenchyma.         At 1811, I updated patient on test results and plan.    Diagnosis:    ICD-10-CM    1. Weakness on right side of face  R29.810       2. Bell's palsy  G51.0           Disposition:  Discharge    MD Meenakshi Bear, Omar Claire MD  06/09/25 6909

## 2025-06-14 ENCOUNTER — HEALTH MAINTENANCE LETTER (OUTPATIENT)
Age: 45
End: 2025-06-14

## 2025-06-17 ENCOUNTER — OFFICE VISIT (OUTPATIENT)
Dept: FAMILY MEDICINE | Facility: CLINIC | Age: 45
End: 2025-06-17
Payer: COMMERCIAL

## 2025-06-17 VITALS
HEIGHT: 70 IN | WEIGHT: 250.2 LBS | OXYGEN SATURATION: 98 % | RESPIRATION RATE: 16 BRPM | HEART RATE: 80 BPM | TEMPERATURE: 98.1 F | BODY MASS INDEX: 35.82 KG/M2 | DIASTOLIC BLOOD PRESSURE: 82 MMHG | SYSTOLIC BLOOD PRESSURE: 120 MMHG

## 2025-06-17 DIAGNOSIS — F34.1 DYSTHYMIA: ICD-10-CM

## 2025-06-17 DIAGNOSIS — G47.9 SLEEP DISORDER: ICD-10-CM

## 2025-06-17 DIAGNOSIS — F41.1 GAD (GENERALIZED ANXIETY DISORDER): ICD-10-CM

## 2025-06-17 DIAGNOSIS — E11.9 TYPE 2 DIABETES MELLITUS WITHOUT COMPLICATION, UNSPECIFIED WHETHER LONG TERM INSULIN USE (H): Primary | ICD-10-CM

## 2025-06-17 DIAGNOSIS — Z11.3 SCREENING EXAMINATION FOR STI: ICD-10-CM

## 2025-06-17 LAB
ALBUMIN SERPL BCG-MCNC: 4.3 G/DL (ref 3.5–5.2)
ALP SERPL-CCNC: 91 U/L (ref 40–150)
ALT SERPL W P-5'-P-CCNC: 31 U/L (ref 0–70)
ANION GAP SERPL CALCULATED.3IONS-SCNC: 11 MMOL/L (ref 7–15)
AST SERPL W P-5'-P-CCNC: 17 U/L (ref 0–45)
BILIRUB SERPL-MCNC: 0.2 MG/DL
BUN SERPL-MCNC: 15.3 MG/DL (ref 6–20)
C TRACH DNA SPEC QL NAA+PROBE: NEGATIVE
CALCIUM SERPL-MCNC: 9.5 MG/DL (ref 8.8–10.4)
CHLORIDE SERPL-SCNC: 104 MMOL/L (ref 98–107)
CREAT SERPL-MCNC: 0.75 MG/DL (ref 0.67–1.17)
CREAT UR-MCNC: 213 MG/DL
EGFRCR SERPLBLD CKD-EPI 2021: >90 ML/MIN/1.73M2
GLUCOSE SERPL-MCNC: 199 MG/DL (ref 70–99)
HCO3 SERPL-SCNC: 25 MMOL/L (ref 22–29)
HIV 1+2 AB+HIV1 P24 AG SERPL QL IA: NONREACTIVE
MICROALBUMIN UR-MCNC: 218 MG/L
MICROALBUMIN/CREAT UR: 102.35 MG/G CR (ref 0–17)
N GONORRHOEA DNA SPEC QL NAA+PROBE: NEGATIVE
POTASSIUM SERPL-SCNC: 4 MMOL/L (ref 3.4–5.3)
PROT SERPL-MCNC: 7.4 G/DL (ref 6.4–8.3)
SODIUM SERPL-SCNC: 140 MMOL/L (ref 135–145)
SPECIMEN TYPE: NORMAL
SPECIMEN TYPE: NORMAL
TSH SERPL DL<=0.005 MIU/L-ACNC: 3.37 UIU/ML (ref 0.3–4.2)

## 2025-06-17 PROCEDURE — 86780 TREPONEMA PALLIDUM: CPT | Performed by: PHYSICIAN ASSISTANT

## 2025-06-17 PROCEDURE — 3079F DIAST BP 80-89 MM HG: CPT | Performed by: PHYSICIAN ASSISTANT

## 2025-06-17 PROCEDURE — 1126F AMNT PAIN NOTED NONE PRSNT: CPT | Performed by: PHYSICIAN ASSISTANT

## 2025-06-17 PROCEDURE — 86695 HERPES SIMPLEX TYPE 1 TEST: CPT | Performed by: PHYSICIAN ASSISTANT

## 2025-06-17 PROCEDURE — 82043 UR ALBUMIN QUANTITATIVE: CPT | Performed by: PHYSICIAN ASSISTANT

## 2025-06-17 PROCEDURE — 87389 HIV-1 AG W/HIV-1&-2 AB AG IA: CPT | Performed by: PHYSICIAN ASSISTANT

## 2025-06-17 PROCEDURE — 86696 HERPES SIMPLEX TYPE 2 TEST: CPT | Performed by: PHYSICIAN ASSISTANT

## 2025-06-17 PROCEDURE — 36415 COLL VENOUS BLD VENIPUNCTURE: CPT | Performed by: PHYSICIAN ASSISTANT

## 2025-06-17 PROCEDURE — 82570 ASSAY OF URINE CREATININE: CPT | Performed by: PHYSICIAN ASSISTANT

## 2025-06-17 PROCEDURE — 87491 CHLMYD TRACH DNA AMP PROBE: CPT | Performed by: PHYSICIAN ASSISTANT

## 2025-06-17 PROCEDURE — 80053 COMPREHEN METABOLIC PANEL: CPT | Performed by: PHYSICIAN ASSISTANT

## 2025-06-17 PROCEDURE — 99214 OFFICE O/P EST MOD 30 MIN: CPT | Performed by: PHYSICIAN ASSISTANT

## 2025-06-17 PROCEDURE — 87591 N.GONORRHOEAE DNA AMP PROB: CPT | Performed by: PHYSICIAN ASSISTANT

## 2025-06-17 PROCEDURE — 84443 ASSAY THYROID STIM HORMONE: CPT | Performed by: PHYSICIAN ASSISTANT

## 2025-06-17 PROCEDURE — 86803 HEPATITIS C AB TEST: CPT | Performed by: PHYSICIAN ASSISTANT

## 2025-06-17 PROCEDURE — 3074F SYST BP LT 130 MM HG: CPT | Performed by: PHYSICIAN ASSISTANT

## 2025-06-17 RX ORDER — METFORMIN HYDROCHLORIDE 500 MG/1
1000 TABLET, EXTENDED RELEASE ORAL 2 TIMES DAILY WITH MEALS
Qty: 360 TABLET | Refills: 0 | Status: SHIPPED | OUTPATIENT
Start: 2025-06-17

## 2025-06-17 RX ORDER — ESCITALOPRAM OXALATE 10 MG/1
10 TABLET ORAL DAILY
Qty: 90 TABLET | Refills: 1 | Status: SHIPPED | OUTPATIENT
Start: 2025-06-17

## 2025-06-17 RX ORDER — ACYCLOVIR 800 MG/1
1 TABLET ORAL CONTINUOUS
Qty: 6 EACH | Refills: 11 | Status: SHIPPED | OUTPATIENT
Start: 2025-06-17

## 2025-06-17 ASSESSMENT — PATIENT HEALTH QUESTIONNAIRE - PHQ9
10. IF YOU CHECKED OFF ANY PROBLEMS, HOW DIFFICULT HAVE THESE PROBLEMS MADE IT FOR YOU TO DO YOUR WORK, TAKE CARE OF THINGS AT HOME, OR GET ALONG WITH OTHER PEOPLE: NOT DIFFICULT AT ALL
SUM OF ALL RESPONSES TO PHQ QUESTIONS 1-9: 23
SUM OF ALL RESPONSES TO PHQ QUESTIONS 1-9: 23

## 2025-06-17 ASSESSMENT — PAIN SCALES - GENERAL: PAINLEVEL_OUTOF10: NO PAIN (0)

## 2025-06-17 NOTE — PROGRESS NOTES
Assessment & Plan     (E11.9) Type 2 diabetes mellitus without complication, unspecified whether long term insulin use (H)  (primary encounter diagnosis)  Comment: History of diabetes.  Discussed with patient starting Mounjaro.  Reviewed side effect profile of medications.  Follow-up in 3 months for recheck.  Continue with metformin.   Plan: Albumin Random Urine Quantitative with Creat         Ratio, tirzepatide (MOUNJARO) 2.5 MG/0.5ML SOAJ        auto-injector pen, tirzepatide (MOUNJARO) 5         MG/0.5ML SOAJ auto-injector pen, Tirzepatide         (MOUNJARO) 7.5 MG/0.5ML SOAJ auto-injector pen,        Comprehensive metabolic panel (BMP + Alb, Alk         Phos, ALT, AST, Total. Bili, TP), metFORMIN         (GLUCOPHAGE XR) 500 MG 24 hr tablet, Continuous        Glucose Sensor (FREESTYLE YARELY 3 SENSOR) MISC            (G47.9) Sleep disorder  Comment: Ongoing issues with sleep.  Both troubles falling and staying asleep.  Discussed sleep medicine referral.    (F41.1) AIMEE (generalized anxiety disorder)  Comment: History of generalized anxiety.  Restarting Lexapro.  Has passive thoughts of self-harm.  Denies any active suicidal ideation or plan.  Discussed with patient any worsening new concerns to seek emergent evaluation.  Follow-up in 3 months for recheck  Plan: escitalopram (LEXAPRO) 10 MG tablet           (F34.1) Dysthymia  Comment: More depression symptoms as of late.  Has passive thoughts of self-harm without any suicidal ideation or plan.  If any worsening to seek emergent evaluation.  Follow-up in 3 months for recheck.  Continue with therapy.  Plan: TSH with free T4 reflex, escitalopram (LEXAPRO)        10 MG tablet          (Z11.3) Screening examination for STI  Comment: Discussed screening STI.  Has had numerous partners.  Occasional penile irritation no active lesions or sores.  Plan: NEISSERIA GONORRHOEA PCR, CHLAMYDIA TRACHOMATIS        PCR, Treponema Abs w Reflex to RPR and Titer,         HIV Antigen  "Antibody Combo, Hepatitis C Screen         Reflex to HCV RNA Quant and Genotype, Herpes         Simplex Virus 1 and 2 IgG          Depression Screening Follow Up        6/17/2025     9:26 AM   PHQ   PHQ-9 Total Score 23    Q9: Thoughts of better off dead/self-harm past 2 weeks More than half the days   F/U: Thoughts of suicide or self-harm No   F/U: Safety concerns No       Patient-reported     Discussed medication management.  Follow-up in 3 months       Bandar Handy is a 44 year old, presenting for the following health issues:  Jesse Palsy, STD, and LAB REQUEST (Pt will like some labs done today. )      6/17/2025     9:36 AM   Additional Questions   Roomed by TWAN ALAN   Accompanied by SELF     STD    History of Present Illness       Reason for visit:  Std test  Symptom onset:  3-7 days ago  Symptoms include:  Jesse palsy  Symptom intensity:  Moderate  Symptom progression:  Staying the same  Had these symptoms before:  No  What makes it worse:  No  What makes it better:  No He is missing 2 dose(s) of medications per week.  He is not taking prescribed medications regularly due to remembering to take.    '  Patient was recently diagnosed with Bell's palsy.  Continued right facial droop has been improving.      Patient is interested in alternative diabetes medications has been taking metformin 1000 mg twice daily.  Last hemoglobin A1c 5/13/2025 at 10.1. No rice, breads, or sugary beverages.  Patient has been frustrated at lack of improvement of glucose numbers.  No family history of medullary thyroid cancer or MENS.  No history of gallbladder issues or pancreatitis.    Mood - started therapy recently online with [facility]. Issues with falling and staying asleep.   Caffeine daily. Thoughts like \"I feel like everyone better off if I wasn't here.\" Not thinking about ending myself.  Denies any suicidal ideation.      Review of Systems  Constitutional, neuro, ENT, endocrine, pulmonary, cardiac, gastrointestinal, " "genitourinary, musculoskeletal, integument and psychiatric systems are negative, except as otherwise noted.      Objective    /82   Pulse 80   Temp 98.1  F (36.7  C) (Tympanic)   Resp 16   Ht 1.778 m (5' 10\")   Wt 113.5 kg (250 lb 3.2 oz)   SpO2 98%   BMI 35.90 kg/m    Body mass index is 35.9 kg/m .  Physical Exam   GENERAL: alert, no distress, and over weight  RESP: lungs clear to auscultation - no rales, rhonchi or wheezes  CV: regular rates and rhythm, no murmur, click or rub, peripheral pulses strong, and no peripheral edema  ABDOMEN: soft, nontender, no hepatosplenomegaly, no masses and bowel sounds normal  : Normal-appearing external genitalia.  No testicular tenderness.  MS: no gross musculoskeletal defects noted, no edema         Signed Electronically by: Thierno Farmer PA-C    "

## 2025-06-18 ENCOUNTER — TELEPHONE (OUTPATIENT)
Dept: FAMILY MEDICINE | Facility: CLINIC | Age: 45
End: 2025-06-18
Payer: COMMERCIAL

## 2025-06-18 ENCOUNTER — RESULTS FOLLOW-UP (OUTPATIENT)
Dept: FAMILY MEDICINE | Facility: CLINIC | Age: 45
End: 2025-06-18
Payer: COMMERCIAL

## 2025-06-18 ENCOUNTER — PATIENT OUTREACH (OUTPATIENT)
Dept: CARE COORDINATION | Facility: CLINIC | Age: 45
End: 2025-06-18
Payer: COMMERCIAL

## 2025-06-18 DIAGNOSIS — E11.9 TYPE 2 DIABETES MELLITUS WITHOUT COMPLICATION, UNSPECIFIED WHETHER LONG TERM INSULIN USE (H): Primary | ICD-10-CM

## 2025-06-18 LAB
HCV AB SERPL QL IA: NONREACTIVE
HSV1 IGG SERPL QL IA: 0.21 INDEX
HSV1 IGG SERPL QL IA: NORMAL
HSV2 IGG SERPL QL IA: 0.15 INDEX
HSV2 IGG SERPL QL IA: NORMAL
T PALLIDUM AB SER QL: NONREACTIVE

## 2025-06-18 NOTE — TELEPHONE ENCOUNTER
Prior Authorization Retail Medication Request    Medication/Dose: tirzepatide (MOUNJARO) 5 MG/0.5ML SOAJ auto-injector pen  Diagnosis and ICD code (if different than what is on RX):  Type 2 diabetes mellitus without complication, unspecified whether long term insulin use (H) [E11.9]   New/renewal/insurance change PA/secondary ins. PA:  Previously Tried and Failed:    Rationale:      Covermymeds Key: [id number]

## 2025-06-18 NOTE — TELEPHONE ENCOUNTER
Prior Authorization Retail Medication Request    Medication/Dose: Tirzepatide (MOUNJARO) 7.5 MG/0.5ML SOAJ auto-injector pen  Diagnosis and ICD code (if different than what is on RX):  Type 2 diabetes mellitus without complication, unspecified whether long term insulin use (H) [E11.9]   New/renewal/insurance change PA/secondary ins. PA:  Previously Tried and Failed:    Rationale:      Covermymeds Key: [id number]

## 2025-06-19 NOTE — TELEPHONE ENCOUNTER
PA Initiation    Medication: MOUNJARO 7.5 MG/0.5ML SC SOAJ  Insurance Company: [employer] - Phone [phone] Fax [phone]  Pharmacy Filling the Rx: [id number] - [place], [place] - [address]  Filling Pharmacy Phone: [phone]  Filling Pharmacy Fax: [phone]  Start Date: 6/19/2025

## 2025-06-19 NOTE — TELEPHONE ENCOUNTER
PA Initiation    Medication: MOUNJARO 5 MG/0.5ML SC SOAJ  Insurance Company: [employer] - Phone [phone] Fax [phone]  Pharmacy Filling the Rx: [id number] - [place], [place] - [address]  Filling Pharmacy Phone: [phone]  Filling Pharmacy Fax: [phone]  Start Date: 6/19/2025

## 2025-06-20 NOTE — TELEPHONE ENCOUNTER
PRIOR AUTHORIZATION DENIED    Medication: MOUNJARO 5 MG/0.5ML SC SOAJ    Insurance Company: [employer] - Phone [phone] Fax [phone]    Denial Date: 6/20/2025    Denial Reason(s): Patient needs to try and fail 2 preferred medications: Bydureon BCise, Byetta, Janumet, Janumet XR, Januvia, Jentadueto, Jentadueto XR, Ozempic, Symlin pen, Tradjenta, Victoza.     Appeal Information:

## 2025-06-20 NOTE — TELEPHONE ENCOUNTER
PRIOR AUTHORIZATION DENIED    Medication: MOUNJARO 7.5 MG/0.5ML SC SOAJ    Insurance Company: [employer] - Phone [phone] Fax [phone]    Denial Date: 6/20/2025    Denial Reason(s): Patient needs to try and fail 2 preferred medications: Bydureon BCise, Byetta, Janumet, Janumet XR, Januvia, Jentadueto, Jentadueto XR, Ozempic, Symlin pen, Tradjenta, Victoza.     Appeal Information:

## 2025-06-25 ENCOUNTER — TELEPHONE (OUTPATIENT)
Dept: FAMILY MEDICINE | Facility: CLINIC | Age: 45
End: 2025-06-25
Payer: COMMERCIAL

## 2025-06-25 DIAGNOSIS — E11.9 TYPE 2 DIABETES MELLITUS WITHOUT COMPLICATION, UNSPECIFIED WHETHER LONG TERM INSULIN USE (H): ICD-10-CM

## 2025-06-25 NOTE — TELEPHONE ENCOUNTER
Message from [facility], in regards to Ozempic 0.25 or 0.5 mg.    Patient states that he will only be doing 0.25 mg for 4 weeks before increasing to 0.5 mg once weekly. If we can get a new RX with those directions for insurance purposes.    Stormy Nye [facility]

## 2025-06-25 NOTE — TELEPHONE ENCOUNTER
Okay to hold for PCP review, he is out of office but reviewing Epic periodically. Judy Castellanos, CNP

## 2025-06-25 NOTE — TELEPHONE ENCOUNTER
Pharmacy is calling because prescription Ozempic needs to be rewritten due to insurance issues.     Need to be written as:    Ozempic 2 mg/2 ml pen, inject 0.25 mg every week for 4 weeks and then increase to 0.5 mg every week for 4 weeks and then increase to 1 mg every week for 4 weeks.     Routing to provider, please review. Darlene Liang, RN, BSN, PHN

## 2025-06-25 NOTE — TELEPHONE ENCOUNTER
Patient is calling about his prescription that was sent to the pharmacy for Ozempic.    Patient states he went to the pharmacy and was told it was a 42 day supply. Patient showed the pharmacy what he was told in his "[employer] message about his dosing and pharmacy states that the way his prescription is written it's for 42 days.     Routing to Rns. Please contact pharmacy to clarify what is wrong with the prescription and what we need to change.     Pharmacy: [facility] [phone]  Prescription: Ozempic, there are three orders one to  now and 2 that are future ordered for titrating up the dosage.     Darlene Liang, RN, BSN, PHN

## 2025-07-05 ENCOUNTER — HEALTH MAINTENANCE LETTER (OUTPATIENT)
Age: 45
End: 2025-07-05

## 2025-07-17 ENCOUNTER — OFFICE VISIT (OUTPATIENT)
Dept: OPTOMETRY | Facility: CLINIC | Age: 45
End: 2025-07-17
Payer: COMMERCIAL

## 2025-07-17 DIAGNOSIS — H53.021 REFRACTIVE AMBLYOPIA OF RIGHT EYE: ICD-10-CM

## 2025-07-17 DIAGNOSIS — Z01.01 VISION EXAM WITH ABNORMAL FINDINGS: Primary | ICD-10-CM

## 2025-07-17 DIAGNOSIS — E11.9 TYPE 2 DIABETES MELLITUS WITHOUT COMPLICATION, UNSPECIFIED WHETHER LONG TERM INSULIN USE (H): ICD-10-CM

## 2025-07-17 DIAGNOSIS — H52.4 PRESBYOPIA: ICD-10-CM

## 2025-07-17 DIAGNOSIS — H52.223 REGULAR ASTIGMATISM OF BOTH EYES: ICD-10-CM

## 2025-07-17 PROBLEM — Z86.69 H/O BELL'S PALSY: Status: ACTIVE | Noted: 2025-07-17

## 2025-07-17 ASSESSMENT — VISUAL ACUITY
METHOD: SNELLEN - LINEAR
OD_CC: 20/30 +2
OS_CC: 20/20
OD_CC: 20/20
OS_CC: 20/25 -1
CORRECTION_TYPE: GLASSES

## 2025-07-17 ASSESSMENT — CONF VISUAL FIELD
OS_INFERIOR_NASAL_RESTRICTION: 0
OS_NORMAL: 1
OS_INFERIOR_TEMPORAL_RESTRICTION: 0
OS_SUPERIOR_NASAL_RESTRICTION: 0
OD_INFERIOR_TEMPORAL_RESTRICTION: 0
OD_SUPERIOR_TEMPORAL_RESTRICTION: 0
OD_SUPERIOR_NASAL_RESTRICTION: 0
METHOD: COUNTING FINGERS
OD_INFERIOR_NASAL_RESTRICTION: 0
OS_SUPERIOR_TEMPORAL_RESTRICTION: 0
OD_NORMAL: 1

## 2025-07-17 ASSESSMENT — REFRACTION_MANIFEST
OD_SPHERE: -4.75
METHOD_AUTOREFRACTION: 1
OD_AXIS: 083
OD_CYLINDER: +5.00
OS_CYLINDER: +4.00
OS_AXIS: 093
OS_SPHERE: -4.00
OS_ADD: +2.00
OD_AXIS: 081
OD_ADD: +2.00
OD_CYLINDER: +5.00
OS_SPHERE: -4.00
OD_SPHERE: -4.75
OS_AXIS: 092
OS_CYLINDER: +4.25

## 2025-07-17 ASSESSMENT — SLIT LAMP EXAM - LIDS
COMMENTS: NORMAL
COMMENTS: NORMAL

## 2025-07-17 ASSESSMENT — TONOMETRY
IOP_METHOD: APPLANATION
OD_IOP_MMHG: 12
OS_IOP_MMHG: 12

## 2025-07-17 ASSESSMENT — KERATOMETRY
OD_AXISANGLE2_DEGREES: 169
OD_K1POWER_DIOPTERS: 43.00
OS_AXISANGLE_DEGREES: 094
OS_AXISANGLE2_DEGREES: 004
OD_K2POWER_DIOPTERS: 47.50
OS_K2POWER_DIOPTERS: 47.00
OD_AXISANGLE_DEGREES: 079
OS_K1POWER_DIOPTERS: 43.00

## 2025-07-17 ASSESSMENT — REFRACTION_WEARINGRX
OD_AXIS: 083
SPECS_TYPE: SVL
OS_SPHERE: -4.25
OS_CYLINDER: +4.00
OD_SPHERE: -4.75
OD_CYLINDER: +5.25
OS_AXIS: 093

## 2025-07-17 ASSESSMENT — CUP TO DISC RATIO
OD_RATIO: 0.3
OS_RATIO: 0.3

## 2025-07-17 NOTE — PATIENT INSTRUCTIONS
Fill glasses prescription  Allow 2 weeks to adapt to change in glasses  Work on improving blood sugar control  Return in 1 year for diabetic eye exam      Blood sugar and blood pressure control are very important in the prevention of ocular complications from diabetes.       Thu Andrea, OD  [phone]

## 2025-07-17 NOTE — PROGRESS NOTES
Chief Complaint   Patient presents with    Diabetic Eye Exam        Chief Complaint(s) and History of Present Illness(es)       Diabetic Eye Exam              Diabetes Type: Type 2, on insulin and taking oral medications since 6-17-25     Duration: years    Blood Sugars: is controlled                      Lab Results   Component Value Date    A1C 7.2 12/04/2023    A1C 9.7 05/24/2023    A1C 14.4 04/19/2023            Last Eye Exam: 06/22/2023  Dilated Previously: Yes    What are you currently using to see?  Glasses SVL - wears full time    Distance Vision Acuity: Satisfied with vision    Near Vision Acuity: Not satisfied , takes photos and zooms in , large font on phone     Eye Comfort: dry and itchy  Do you use eye drops? 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     6-25 Right Gutierrez's Palsy symptoms resolved a few weeks ago     OBJECTIVE: See Ophthalmology exam    ASSESSMENT:    ICD-10-CM    1. Vision exam with abnormal findings  Z01.01       2. Type 2 diabetes mellitus without complication, unspecified whether long term insulin use (H)  E11.9       3. Refractive amblyopia of right eye  H53.021       4. Regular astigmatism of both eyes  H52.223       5. Presbyopia  H52.4           PLAN:    Ole Joseph aware  eye exam results will be sent to No Ref-Primary, Physician.  Patient Instructions   Fill glasses prescription  Allow 2 weeks to adapt to change in glasses  Work on improving blood sugar control  Return in 1 year for diabetic eye exam      Blood sugar and blood pressure control are very important in the prevention of ocular complications from diabetes.       Thu Andrea, OD  [phone]

## 2025-07-17 NOTE — LETTER
7/17/2025      Ole Joseph  [address]  [facility] [place] [address]      Dear Colleague,    Thank you for referring your patient, Ole Joseph, to the [facility]. No diabetic retinopathy was found at eye exam.  Please see a copy of my visit note below.    Chief Complaint   Patient presents with     Diabetic Eye Exam        Chief Complaint(s) and History of Present Illness(es)       Diabetic Eye Exam              Diabetes Type: Type 2, on insulin and taking oral medications since 6-17-25     Duration: years    Blood Sugars: is controlled                      Lab Results   Component Value Date    A1C 7.2 12/04/2023    A1C 9.7 05/24/2023    A1C 14.4 04/19/2023            Last Eye Exam: 06/22/2023  Dilated Previously: Yes    What are you currently using to see?  Glasses SVL - wears full time    Distance Vision Acuity: Satisfied with vision    Near Vision Acuity: Not satisfied , takes photos and zooms in , large font on phone     Eye Comfort: dry and itchy  Do you use eye drops? 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     6-25 Right Gutierrez's Palsy symptoms resolved a few weeks ago     OBJECTIVE: See Ophthalmology exam    ASSESSMENT:    ICD-10-CM    1. Vision exam with abnormal findings  Z01.01       2. Type 2 diabetes mellitus without complication, unspecified whether long term insulin use (H)  E11.9       3. Refractive amblyopia of right eye  H53.021       4. Regular astigmatism of both eyes  H52.223       5. Presbyopia  H52.4           PLAN:    Ole Joseph aware  eye exam results will be sent to No Ref-Primary, Physician.  Patient Instructions   Fill glasses prescription  Allow 2 weeks to adapt to change in glasses  Work on improving blood sugar control  Return in 1 year for  diabetic eye exam      Blood sugar and blood pressure control are very important in the prevention of ocular complications from diabetes.       Thu Andrea, OD  [phone]                     Again, thank you for allowing me to participate in the care of your patient.        Sincerely,        Thu Andrea, OD    Electronically signed

## 2025-07-23 ENCOUNTER — APPOINTMENT (OUTPATIENT)
Dept: OPTOMETRY | Facility: CLINIC | Age: 45
End: 2025-07-23
Payer: COMMERCIAL

## 2025-07-23 PROCEDURE — 92340 FIT SPECTACLES MONOFOCAL: CPT | Performed by: OPTOMETRIST

## 2025-08-18 ENCOUNTER — PATIENT OUTREACH (OUTPATIENT)
Dept: CARE COORDINATION | Facility: CLINIC | Age: 45
End: 2025-08-18
Payer: COMMERCIAL